# Patient Record
Sex: MALE | Race: WHITE | URBAN - METROPOLITAN AREA
[De-identification: names, ages, dates, MRNs, and addresses within clinical notes are randomized per-mention and may not be internally consistent; named-entity substitution may affect disease eponyms.]

---

## 2017-02-06 ENCOUNTER — OFFICE VISIT (OUTPATIENT)
Dept: NEUROSURGERY | Facility: CLINIC | Age: 62
End: 2017-02-06

## 2017-02-06 VITALS
BODY MASS INDEX: 30.08 KG/M2 | DIASTOLIC BLOOD PRESSURE: 66 MMHG | WEIGHT: 176.2 LBS | HEART RATE: 60 BPM | HEIGHT: 64 IN | SYSTOLIC BLOOD PRESSURE: 121 MMHG

## 2017-02-06 DIAGNOSIS — M48.061 SPINAL STENOSIS OF LUMBAR REGION WITH RADICULOPATHY: ICD-10-CM

## 2017-02-06 DIAGNOSIS — M54.16 SPINAL STENOSIS OF LUMBAR REGION WITH RADICULOPATHY: ICD-10-CM

## 2017-02-06 DIAGNOSIS — M43.16 SPONDYLOLISTHESIS OF LUMBAR REGION: Primary | ICD-10-CM

## 2017-02-06 RX ORDER — BUPRENORPHINE AND NALOXONE 4; 1 MG/1; MG/1
1 FILM, SOLUBLE BUCCAL; SUBLINGUAL 2 TIMES DAILY
Status: ON HOLD | COMMUNITY
Start: 2017-01-30 | End: 2017-03-24

## 2017-02-06 RX ORDER — PREGABALIN 100 MG/1
100 CAPSULE ORAL 3 TIMES DAILY
Status: ON HOLD | COMMUNITY
Start: 2017-01-13 | End: 2017-03-24

## 2017-02-06 ASSESSMENT — PAIN SCALES - GENERAL: PAINLEVEL: SEVERE PAIN (6)

## 2017-02-06 NOTE — MR AVS SNAPSHOT
After Visit Summary   2/6/2017    Jean-Claude Lowry    MRN: [id number]           Patient Information     Date Of Birth          1955        Visit Information        Provider Department      2/6/2017 8:30 AM Paco Salmeron MD [facility] Neurosurgery        Today's Diagnoses     Spondylolisthesis of lumbar region    -  1    Spinal stenosis of lumbar region with radiculopathy           Follow-ups after your visit        Additional Services     [facility] Referral       Your provider has referred you to the [facility].    Reason for Referral: Pre-Operative Pain Consult - the procedure will be performed at the following hospital: [facility]  3/22/2017    Please complete the following questions:    What is your diagnosis for the patient's pain? Lumbar stenosis    Do you have any specific questions for the pain specialist? No    Are there any red flags that may impact the assessment or management of the patient? Active or recent alcohol, drug or prescription medication misuse      **ANY DIAGNOSTIC TESTS THAT ARE NOT IN EPIC SHOULD BE SENT TO THE PAIN CENTER**    Please note the Pre-Op Pain Consult must be scheduled 2-3 weeks prior to the patient's surgery.  Patient's trying to schedule within 2 weeks of surgery may not be accommodated.     Pre-Op Pain Consults are only good for 30 days.    REGARDING OPIOID MEDICATIONS:  We will always address appropriateness of opioid pain medications, but we generally will not automatically take on a prescribing role. When we do take on prescribing of opioids for chronic pain, it is in collaboration with the referring physician for an intermediate period of time (months), with an expectation that the primary physician or provider will assume the prescribing role if medications are effective at stable doses with demonstrated compliance.  Therefore, please do not assume that your prescribing responsibilities end on the day of pain clinic  consultation.  Is this agreeable to you? NO: Not currently managing pain medications.  Will mangage post-op for period of 4-6 weeks as needed.     For any questions, contact the [facility] at [phone].    Please be aware that coverage of these services is subject to the terms and limitations of your health insurance plan.  Call member services at your health plan with any benefit or coverage questions.      Please bring the following with you to your appointment:    (1) Any X-Rays, CTs or MRIs which have been performed.  Contact the facility where they were done to arrange for  prior to your scheduled appointment.    (2) List of current medications   (3) This referral request   (4) Any documents/labs given to you for this referral            PAC Visit Referral (For [facility] Only)                 Your next 10 appointments already scheduled     Mar 15, 2017  9:30 AM CDT   (Arrive by 9:15 AM)   PAC RN ASSESSMENT with Uc Pac Rn   [facility] Preoperative Assessment Center ([facility])    [address]  [address] [facility] [id number] [phone]            Mar 15, 2017 10:00 AM CDT   (Arrive by 9:45 AM)   PAC EVALUATION with  Pac Iggy 5   [facility] Preoperative Assessment Center ([facility])    [address]  [address] [facility] [id number] [phone]            Mar 15, 2017 11:00 AM CDT   (Arrive by 10:45 AM)   PAC Anesthesia Consult with  Pac Anesthesiologist   [facility] Pre[facility] ([facility])    [address]  [address] [facility] [id number] [phone]            Mar 22, 2017   Procedure with Paco Salmeron MD   [facility], [place], Same Day Surgery (--)    [address] [id number] [phone]              Who to contact     Please call your clinic at [phone] to:    Ask questions about your health    Make or cancel  "appointments    Discuss your medicines    Learn about your test results    Speak to your doctor   If you have compliments or concerns about an experience at your clinic, or if you wish to file a complaint, please contact [facility] Patient Relations at [phone] or email us at [email or web address]         Additional Information About Your Visit        [employer] Information     [employer] is an electronic gateway that provides easy, online access to your medical records. With [employer], you can request a clinic appointment, read your test results, renew a prescription or communicate with your care team.     To sign up for [employer] visit the website at [email or web address]   You will be asked to enter the access code listed below, as well as some personal information. Please follow the directions to create your username and password.     Your access code is: [email or web address]  Expires: 2017  6:30 AM     Your access code will  in 90 days. If you need help or a new code, please contact your [facility] Clinic or call [phone] for assistance.        Care EveryWhere ID     This is your Care EveryWhere ID. This could be used by other organizations to access your [facility] medical records  [id number]        Your Vitals Were     Pulse Height BMI (Body Mass Index)             60 1.626 m (5' 4\") 30.24 kg/m2          Blood Pressure from Last 3 Encounters:   17 121/66   10/03/16 122/55   05/14/15 119/75    Weight from Last 3 Encounters:   17 79.9 kg (176 lb 3.2 oz)   10/03/16 82.6 kg (182 lb)   05/14/15 85.6 kg (188 lb 12.8 oz)              We Performed the Following     [facility] Referral     Abby-Operative Worksheet        Primary Care Provider Office Phone # Fax #    Quang Mejia [phone]       [facility] [address] [address]        Thank you!     Thank you for choosing [facility] " NEUROSURGERY  for your care. Our goal is always to provide you with excellent care. Hearing back from our patients is one way we can continue to improve our services. Please take a few minutes to complete the written survey that you may receive in the mail after your visit with us. Thank you!             Your Updated Medication List - Protect others around you: Learn how to safely use, store and throw away your medicines at www.disposemymeds.org.          This list is accurate as of: 2/6/17 11:59 PM.  Always use your most recent med list.                   Brand Name Dispense Instructions for use    ACIPHEX PO      Take 20 mg by mouth daily       CYMBALTA PO      Take 60 mg by mouth daily       GABAPENTIN PO      Take 600 mg by mouth daily       LISINOPRIL PO      Take 20 mg by mouth daily       LYRICA 100 MG capsule   Generic drug:  pregabalin          SUBOXONE 4-1 MG per film   Generic drug:  buprenorphine HCl-naloxone HCl      Place 1 each under the tongue       TRAZODONE HCL PO      Take 150-300 mg by mouth At Bedtime

## 2017-02-06 NOTE — PROGRESS NOTES
It was a pleasure to see Jean-Claude Lowry today in Neurosurgery Clinic. he is a 62 year old male who returns after a MRI for evaluation of his lumbar stenosis, spondylolisthesis and radiculopathy. He continues to have the same symptoms. He has seen GI at [facility] and there is a letter in our system clearing him for surgery from them. He works with Dr. Alcantar for addiction medicine.    Given his symptoms and imaging findings of lumbar spondylolisthesis and stenosis with radiculopathy, I believe a L4-5 fusion and decompression would be the best course of action. The risks benefits and alternatives to the procedure were discussed, questions solicited and answered, and the patient wishes to proceed with surgery. Risks including but not limited to: bleeding, infection, CSF leak, neurologic injury, failure to improve, need for future surgery.    We will work on scheduling surgery. We will obtain preop pain management consult and discuss with Dr. Alcantar.

## 2017-02-06 NOTE — NURSING NOTE
Chief Complaint   Patient presents with     RECHECK     [facility] RETURN - Dx: L4-L5 spinal canal stenosis with accociated radiculopathy - has had MRI     Evelia Campa MA

## 2017-02-06 NOTE — LETTER
2/6/2017       RE: Jean-Claude Lowry  [address] [facility]  [address]  [place] [address]     Dear Colleague,    Thank you for referring your patient, Jean-Claude Lowry, to the [facility] NEUROSURGERY at [facility]. Please see a copy of my visit note below.    It was a pleasure to see Jean-Claude Lowry today in Neurosurgery Clinic. he is a 62 year old male who returns after a MRI for evaluation of his lumbar stenosis, spondylolisthesis and radiculopathy. He continues to have the same symptoms. He has seen GI at [facility] and there is a letter in our system clearing him for surgery from them. He works with Dr. Alcatnar for addiction medicine.    Given his symptoms and imaging findings of lumbar spondylolisthesis and stenosis with radiculopathy, I believe a L4-5 fusion and decompression would be the best course of action. The risks benefits and alternatives to the procedure were discussed, questions solicited and answered, and the patient wishes to proceed with surgery. Risks including but not limited to: bleeding, infection, CSF leak, neurologic injury, failure to improve, need for future surgery.    We will work on scheduling surgery. We will obtain preop pain management consult and discuss with Dr. Alcantar.    Again, thank you for allowing me to participate in the care of your patient.      Sincerely,    Paco Salmeron MD

## 2017-02-27 ENCOUNTER — TELEPHONE (OUTPATIENT)
Dept: NEUROSURGERY | Facility: CLINIC | Age: 62
End: 2017-02-27

## 2017-02-27 NOTE — TELEPHONE ENCOUNTER
PC to pt in response to in-basket message requesting call back in regards to pre-op questions.   No answer at given number.  Left VM with contact info and instructions to return call at patient's convenience.

## 2017-03-15 ENCOUNTER — OFFICE VISIT (OUTPATIENT)
Dept: SURGERY | Facility: CLINIC | Age: 62
End: 2017-03-15

## 2017-03-15 ENCOUNTER — ANESTHESIA EVENT (OUTPATIENT)
Dept: SURGERY | Facility: CLINIC | Age: 62
DRG: 460 | End: 2017-03-15
Payer: MEDICARE

## 2017-03-15 ENCOUNTER — ALLIED HEALTH/NURSE VISIT (OUTPATIENT)
Dept: SURGERY | Facility: CLINIC | Age: 62
End: 2017-03-15

## 2017-03-15 VITALS
WEIGHT: 185.7 LBS | HEIGHT: 64 IN | BODY MASS INDEX: 31.7 KG/M2 | SYSTOLIC BLOOD PRESSURE: 119 MMHG | DIASTOLIC BLOOD PRESSURE: 72 MMHG | RESPIRATION RATE: 16 BRPM | TEMPERATURE: 98.6 F | HEART RATE: 82 BPM | OXYGEN SATURATION: 93 %

## 2017-03-15 DIAGNOSIS — Z01.818 PREOP EXAMINATION: Primary | ICD-10-CM

## 2017-03-15 DIAGNOSIS — M48.061 LUMBAR STENOSIS: ICD-10-CM

## 2017-03-15 DIAGNOSIS — Z01.818 PRE-OP EXAMINATION: Primary | ICD-10-CM

## 2017-03-15 LAB
ALBUMIN SERPL-MCNC: 3.2 G/DL (ref 3.4–5)
ALBUMIN UR-MCNC: NEGATIVE MG/DL
ALP SERPL-CCNC: 82 U/L (ref 40–150)
ALT SERPL W P-5'-P-CCNC: 14 U/L (ref 0–70)
ANION GAP SERPL CALCULATED.3IONS-SCNC: 6 MMOL/L (ref 3–14)
APPEARANCE UR: CLEAR
APTT PPP: 29 SEC (ref 22–37)
AST SERPL W P-5'-P-CCNC: 12 U/L (ref 0–45)
BILIRUB SERPL-MCNC: 0.8 MG/DL (ref 0.2–1.3)
BILIRUB UR QL STRIP: NEGATIVE
BUN SERPL-MCNC: 17 MG/DL (ref 7–30)
CALCIUM SERPL-MCNC: 8.6 MG/DL (ref 8.5–10.1)
CHLORIDE SERPL-SCNC: 102 MMOL/L (ref 94–109)
CO2 SERPL-SCNC: 31 MMOL/L (ref 20–32)
COLOR UR AUTO: YELLOW
CREAT SERPL-MCNC: 0.86 MG/DL (ref 0.66–1.25)
ERYTHROCYTE [DISTWIDTH] IN BLOOD BY AUTOMATED COUNT: 13 % (ref 10–15)
GFR SERPL CREATININE-BSD FRML MDRD: ABNORMAL ML/MIN/1.7M2
GLUCOSE SERPL-MCNC: 212 MG/DL (ref 70–99)
GLUCOSE UR STRIP-MCNC: NEGATIVE MG/DL
HBA1C MFR BLD: 6.2 % (ref 4.3–6)
HCT VFR BLD AUTO: 41.7 % (ref 40–53)
HGB BLD-MCNC: 13.4 G/DL (ref 13.3–17.7)
HGB UR QL STRIP: NEGATIVE
INR PPP: 1.06 (ref 0.86–1.14)
KETONES UR STRIP-MCNC: NEGATIVE MG/DL
LEUKOCYTE ESTERASE UR QL STRIP: NEGATIVE
MCH RBC QN AUTO: 29.5 PG (ref 26.5–33)
MCHC RBC AUTO-ENTMCNC: 32.1 G/DL (ref 31.5–36.5)
MCV RBC AUTO: 92 FL (ref 78–100)
NITRATE UR QL: NEGATIVE
PH UR STRIP: 8 PH (ref 5–7)
PLATELET # BLD AUTO: 136 10E9/L (ref 150–450)
POTASSIUM SERPL-SCNC: 4.3 MMOL/L (ref 3.4–5.3)
PROT SERPL-MCNC: 7.5 G/DL (ref 6.8–8.8)
RBC # BLD AUTO: 4.54 10E12/L (ref 4.4–5.9)
SODIUM SERPL-SCNC: 138 MMOL/L (ref 133–144)
SP GR UR STRIP: 1.01 (ref 1–1.03)
URN SPEC COLLECT METH UR: ABNORMAL
UROBILINOGEN UR STRIP-MCNC: 2 MG/DL (ref 0–2)
WBC # BLD AUTO: 11.7 10E9/L (ref 4–11)

## 2017-03-15 RX ORDER — LEDIPASVIR AND SOFOSBUVIR 90; 400 MG/1; MG/1
1 TABLET, FILM COATED ORAL EVERY MORNING
COMMUNITY
Start: 2017-02-03

## 2017-03-15 NOTE — PATIENT INSTRUCTIONS
Preparing for Your Surgery  Name:  Jean-Claude Lowry   MRN:  [id number]   :  1955   Today's Date:  3/15/2017     Arriving for surgery:  Surgery date:  3/22/17  Surgery time:  9:15 AM  Arrival time:  7:15 AM    Please come to:     [facility]  [address] [place]  [address]    -   parking is available in front of the hospital from 5:15 am to 8:00 pm    -  Stop at the Information Desk in the lobby    -   Inform the information person that you are here for surgery. An escort to 3c will be provided. If you would not like an escort, please proceed to 3C on the 3rd floor. [phone]     What can I eat or drink?  -  You may have solid food or milk products until 8 hours prior to your surgery.  -  You may have clear liquids such as: water, gatorade, tea, black coffee, broth, jello, apple juice or 7up/Sprite until 2 hours prior to your surgery.    Which medicines can I take?        -  Please follow your doctor's recommedations regarding Suboxone.    -  Please take these medications the day of surgery: all regularly scheduled morning medications.    How do I prepare myself?  -  Take two showers: one the night before surgery; and one the morning of surgery.         Use Scrubcare or Hibiclens to wash from neck down.  You may use your own shampoo and conditioner. No other hair products.   -  Do NOT use lotion, powder, deodorant, or antiperspirant the day of your surgery.  -  Do NOT wear any makeup, fingernail polish or jewelry.  -  Do not bring your own medications to the hospital, except for inhalers and eye drops.  -  Bring your ID and insurance card.    Questions or Concerns:  If you have questions or concerns, please call the  Preoperative Assessment Center, Monday-Friday 7AM-7PM:  [phone]    AFTER YOUR SURGERY  Breathing exercises   Breathing exercises help you recover faster. Take deep breaths and let the air out slowly. This will:     Help you wake  up after surgery.    Help prevent complications like pneumonia.  Preventing complications will help you go home sooner.   We may give you a breathing device (incentive spirometer) to encourage you to breathe deeply.   Nausea and vomiting   You may feel sick to your stomach after surgery; if so, let your nurse know.    Pain control:  After surgery, you may have pain. Our goal is to help you manage your pain. Pain medicine will help you feel comfortable enough to do activities that will help you heal.  These activities may include breathing exercises, walking and physical therapy.   To help your health care team treat your pain we will ask: 1) If you have pain  2) where it is located 3) describe your pain in your words  Methods of pain control include medications given by mouth, vein or by nerve block for some surgeries.  We may give you a pain control pump that will:  1) Deliver the medicine through a tube placed in your vein  2) Control the amount of medicine you receive  3) Allow you to push a button to deliver a dose of pain medicine  Sequential Compression Device (SCD) or Pneumo Boots:  You may need to wear SCD S on your legs or feet. These are wraps connected to a machine that pumps in air and releases it. The repeated pumping helps prevent blood clots from forming.

## 2017-03-15 NOTE — PROGRESS NOTES
Preoperative Assessment Center medication history for March 15, 2017 is complete.  See Epic admission navigator for allergy information, pharmacy, prior to admission medications and immunization status.    Operating room staff will still need to confirm medications and last dose information on day of surgery.     Medication history interview sources:  Patient Interview and [facility]     Changes made to PTA medication list (reason)  Added:   - -Harvoni per patient and confirmation with pharmacy     Deleted:   -- lisinopril - patient reports being taken off last month because his BP was low     Changed:   -- added directions for gabapentin   -- added directions for pregabalin   -- added directions for suboxone     Additional medication history information (including reliability of information, actions taken by pharmacist):  -- patient is taking both gabapentin and pregabalin       Prior to Admission medications    Medication Sig Last Dose Taking? Auth Provider   ledipasvir-sofosbuvir (HARVONI)  MG per tablet Take 1 tablet by mouth every morning Taking Yes Reported, Patient   buprenorphine HCl-naloxone HCl (SUBOXONE) 4-1 MG per film Place 1 each under the tongue 2 times daily  Taking Yes Reported, Patient   pregabalin (LYRICA) 100 MG capsule Take 100 mg by mouth 3 times daily  Taking Yes Reported, Patient   RABEprazole Sodium (ACIPHEX PO) Take 20 mg by mouth every morning  Taking Yes Reported, Patient   DULoxetine HCl (CYMBALTA PO) Take 60 mg by mouth At Bedtime  Taking Yes Reported, Patient   TRAZODONE HCL PO Take 150-300 mg by mouth At Bedtime Taking Yes Reported, Patient   GABAPENTIN PO Take by mouth 3 times daily 600 mg AM, 300 mg afternoon, 600 mg HS Taking Yes Reported, Patient         Medication history completed by:   Henrik Javier, Pharm.D, BCPS

## 2017-03-15 NOTE — PHARMACY - PREOPERATIVE ASSESSMENT CENTER
PREOPERATIVE PAIN CONSULT FOR POSTOPERATIVE PAIN MANAGEMENT  Jean-Claude Lowry was seen and interviewed during time of PAC Clinic appointment March 15, 2017. The following recommendations ONLY apply for the planned surgical procedure with Dr Salmeron on 3/22/17 at the [facility] for 1 level spinal fusion.  These postoperative recommendations are intended for patients admitted to the hospital after surgery. These recommendations are only valid for 30 days from the date of service. If there are significant changes in opioid dosing between today and day of surgery the below recommendations may have to be adjusted.   Verbal consent was given by patient to access pharmacy records and [place] Prescription Monitoring Profile: Yes    Based on [place] Prescription Monitoring Profile and patient interview:     - OUTPATIENT MEDICATIONS (related to pain management):  -- Long-acting opioid: None  -- Short-acting opioid: None  -- Intrathecal pump: None  -- Oral adjuvant(s): gabapentin [id number] by mouth TID, pregabalin 100mg by mouth TID, Duloxetine 60mg by mouth at bedtime   -- Topicals: None  -- Bowel Regimen: None     ASSESSMENT:   Jean-Claude Lowry has a history of back pain that starts in his low back, radiates to his left buttock, to this lateran and anterior thigh and down to his big toe. The pain is constant and he describes it as dull and achy. Movement aggravates the pain and he rates it as 7-8/10 on a 0-10 VAS. He is currently on suboxone that was started back in 8/2016 and managed by Dr. Koki Alcantar. The patient's addiction was to oxyCONTIN is what he reports to me. Looking at his chart, he had an intentional opiate overdose in 04/2016 too. I have reached out to Dr. Salmeron who would prefer to have suboxone stopped prior to this procedure. I have also reached out to Dr. Alcantar to relay Dr. Salmeron's wishes and to see how she would like to proceed. Dr. Gabrielson called me back  and we discussed the case. The final plan according to Dr. Alcantar is to stop the suboxone 3 days prior to the procedure. She does not intend to prescribe any opioids during the 3 days that the patient is off the suboxone as she believes he isn't taking a high enough dose of suboxone to require supplemental opioids to prevent withdrawal. She will give him her number to the clinic if the patient has any problems. She will contact the patient to relay this information. She did request that after the procedure, Dr. Salmeron to touch base with her to determine what the typical post operative period is for pain so that she can set up an appointment to resume the patient's suboxone.       Outpatient opioid oral morphine equivalent (OME): 0mg/day    Pain medications tried in the past:   -- patient reports trying all opioids in the past but no SE with any of these  -- because of patient's addiction history with oxyCONTIN, I will avoid using this.   -- patient is 62 years old, and so his renal function may not be the best for morphine so I will recommend utilizing HYDROmorphone.   -- ANNE MARIE history - probable from the previous sleep studies but has another sleep study Friday.     RECOMMENDATIONS:   The following pain management recommendations are made based on information from today's visit and should not replace medical decision-making based on patient condition at the time of surgery or postoperatively.      - PREOPERATIVE:  -- Continue suboxone 4-1mg, 1 film SL BID through 3/18/17. Starting 3/19 and up to the day of surgery, do not take.   -- Continue adjuvants/nonopioid analgesics - gabapentin [id number] by mouth TID, pregabalin 100mg by mouth TID     --  Before surgery recommend gabapentin 300 mg PO x 1 dose in pre-op area (will need to be written on day of surgery).   -- Before surgery consider acetaminophen 1000 mg PO in pre-op   --Consider urine drug screen on day of surgery given patient's history of substance  use.     - INTRAOPERATIVE (Anesthesiologist/CRNA to consider):   -- Regional anesthesia: Defer to anesthesiologist   -- Ketamine IV intraoperatively  -- Dexmedetomidine IV intraoperatively  -- Avoid remifentanil as able to reduce risk of developing hyperalgesia    - POSTOPERATIVE MANAGEMENT:  Place pain management consult to assist with acute postoperative pain management.    Opioid analgesic:  + If able to take oral medications (preferred):   -- Begin with HYDROmorphone 2-4mg by mouth every 3 hour as needed with low tolerance to increase overnight to 4-6mg by mouth every 3 hour as needed if pain not controlled on 4mg. (hold or reduce dose as needed if patient has s/sx sedation or respiratory depression)  -- Initiate HYDROmorphone 0.2-0.4mg IV every 2 hour as needed breakthrough pain      + If unable to take oral medications:   -- HYDROmorphone (Dilaudid) PCA doses 0.2-0.4 mg Q 10 min lockout interval with NO continuous rate. The PCA doses are for acute postoperative pain. If patient is showing s/sx sedation or respiratory depression, decrease PCA doses as necessary.      Nonopioid analgesics:   -- acetaminophen 1,000 mg PO every 8 hr scheduled   -- gabapentin 600 mg PO every 8 hours scheduled (intentional higher than home dose to control pain)  -- pregabalin 100mg by mouth TID     Muscle Relaxant:   --methocarbamol 500 mg PO Q6 hr PRN muscle spasm OR  --If unable to tolerate PO meds, use Diazepam (Valium) 2.5-5 mg IV Q 6 hr PRN muscle spasm    Stool softeners/Laxatives:   -- When appropriate start senna-docusate 1-2 tabs PO BID and Miralax 17 g daily to prevent opioid induced constipation.     Other:  -- Recommend close monitoring of respiratory status postoperatively with capnography and/or SpO2 monitoring.     -- Ketamine IV infusion.  If needed for acute postop uncontrolled pain, the primary service may decide to start ketamine infusion at 5mg/hr (0.6mg/kg/hr based on dry weight of 84 kg). Ketamine should only  be started if the patient is cardiovascularly stable.     Low dose ketamine may be administered on a regular nursing unit according to [facility] ketamine-low dose policy.  Please see: [email or web address]   Obtain acrylic lockbox from central pharmacy  Obtain portless tubing from [employer] .  Order #[id number]  Monitoring:    Vital signs every 4 hours if patient is stable.   More frequent assessments may be indicated if patient is not stable.  Adverse effects:    Psychotomimetic effects (hallucinations or dreamlike feelings) are the most common adverse effect at these low doses, but this adverse event doesn t happen all the time.  These effects may be reduced or prevented by co-administration of a benzodiazepine:  e.g. lorazepam or diazepam    Dose related and therefore less common at  low doses:   hypotension or hypertension, excessive sedation    At low doses, ketamine does NOT affect respiratory function.  Discontinuation of Therapy:No formal weaning process for ketamine is needed    The inpatient pain service will follow up on patient after consult is placed and offer further recommendations for pain management.  If immediate assistance is needed please contact the pain service at the number below.     Henrik Javier RPH  March 15, 2017  12:07 PM    If questions or concerns, please contact the Inpatient Pain Management Service:  Call [phone] after hours, weekends and holidays.   Page [phone] from 8 AM - 3 PM Mon - Fri.

## 2017-03-15 NOTE — MR AVS SNAPSHOT
After Visit Summary   3/15/2017    Jean-Claude Lowry    MRN: [id number]           Patient Information     Date Of Birth          1955        Visit Information        Provider Department      3/15/2017 11:00 AM Rn, [facility]        Care Instructions    Preparing for Your Surgery  Name:  Jean-Claude Lowry   MRN:  [id number]   :  1955   Today's Date:  3/15/2017     Arriving for surgery:  Surgery date:  3/22/17  Surgery time:  9:15 AM  Arrival time:  7:15 AM    Please come to:     [facility], [address]  [address] [place] [address]    -   parking is available in front of the hospital from 5:15 am to 8:00 pm    -  Stop at the Information Desk in the lobby    -   Inform the information person that you are here for surgery. An escort to 3c will be provided. If you would not like an escort, please proceed to 3C on the 3rd floor. [phone]     What can I eat or drink?  -  You may have solid food or milk products until 8 hours prior to your surgery.  -  You may have clear liquids such as: water, gatorade, tea, black coffee, broth, jello, apple juice or 7up/Sprite until 2 hours prior to your surgery.    Which medicines can I take?        -  Please follow your doctor's recommedations regarding Suboxone.    -  Please take these medications the day of surgery: all regularly scheduled morning medications.    How do I prepare myself?  -  Take two showers: one the night before surgery; and one the morning of surgery.         Use Scrubcare or Hibiclens to wash from neck down.  You may use your own shampoo and conditioner. No other hair products.   -  Do NOT use lotion, powder, deodorant, or antiperspirant the day of your surgery.  -  Do NOT wear any makeup, fingernail polish or jewelry.  -  Do not bring your own medications to the hospital, except for inhalers and eye drops.  -  Bring your ID and insurance  card.    Questions or Concerns:  If you have questions or concerns, please call the  [facility] Center, Monday-Friday 7AM-7PM:  [phone]    AFTER YOUR SURGERY  Breathing exercises   Breathing exercises help you recover faster. Take deep breaths and let the air out slowly. This will:     Help you wake up after surgery.    Help prevent complications like pneumonia.  Preventing complications will help you go home sooner.   We may give you a breathing device (incentive spirometer) to encourage you to breathe deeply.   Nausea and vomiting   You may feel sick to your stomach after surgery; if so, let your nurse know.    Pain control:  After surgery, you may have pain. Our goal is to help you manage your pain. Pain medicine will help you feel comfortable enough to do activities that will help you heal.  These activities may include breathing exercises, walking and physical therapy.   To help your health care team treat your pain we will ask: 1) If you have pain  2) where it is located 3) describe your pain in your words  Methods of pain control include medications given by mouth, vein or by nerve block for some surgeries.  We may give you a pain control pump that will:  1) Deliver the medicine through a tube placed in your vein  2) Control the amount of medicine you receive  3) Allow you to push a button to deliver a dose of pain medicine  Sequential Compression Device (SCD) or Pneumo Boots:  You may need to wear SCD S on your legs or feet. These are wraps connected to a machine that pumps in air and releases it. The repeated pumping helps prevent blood clots from forming.         Follow-ups after your visit        Your next 10 appointments already scheduled     Mar 15, 2017 11:30 AM CDT   LAB with  LAB   [facility] ([facility])    [address] [place] [id number] [phone]           Patient must bring picture ID.  Patient should be prepared to  give a urine specimen  Please do not eat 10-12 hours before your appointment if you are coming in fasting for labs on lipids, cholesterol, or glucose (sugar).  Pregnant women should follow their Care Team instructions. Water with medications is okay. Do not drink coffee or other fluids.   If you have concerns about taking  your medications, please ask at office or if scheduling via [employer], send a message by clicking on Secure Messaging, Message Your Care Team.            Mar 15, 2017 11:50 AM CDT   (Arrive by 11:35 AM)   PAC Anesthesia Consult with  Pac Anesthesiologist   [facility] ([facility])    [address]  4th Floor  [facility] [id number] [phone]            Mar 22, 2017   Procedure with Paco Salmeron MD   [facility] [place], Same Day Surgery (--)    [address] [facility] [id number] [phone]              Future tests that were ordered for you today     Open Future Orders        Priority Expected Expires Ordered    ABO/Rh type and screen Routine 3/15/2017 4/14/2017 3/15/2017    CBC with platelets Routine 3/15/2017 4/14/2017 3/15/2017    Comprehensive metabolic panel Routine 3/15/2017 4/14/2017 3/15/2017    Hemoglobin A1c Routine 3/15/2017 4/14/2017 3/15/2017    INR Routine 3/15/2017 4/14/2017 3/15/2017    Partial thromboplastin time Routine 3/15/2017 4/14/2017 3/15/2017    UA reflex to Microscopic and Culture Routine 3/15/2017 4/14/2017 3/15/2017            Who to contact     Please call your clinic at [phone] to:    Ask questions about your health    Make or cancel appointments    Discuss your medicines    Learn about your test results    Speak to your doctor   If you have compliments or concerns about an experience at your clinic, or if you wish to file a complaint, please contact [facility] Patient Relations at [phone] or email us at [email or web address]         Additional  Information About Your Visit        2[employer] Online Information     [employer] Online is an electronic gateway that provides easy, online access to your medical records. With [employer] Online, you can request a clinic appointment, read your test results, renew a prescription or communicate with your care team.     To sign up for [employer] Online visit the website at [email or web address]   You will be asked to enter the access code listed below, as well as some personal information. Please follow the directions to create your username and password.     Your access code is: [email or web address]  Expires: 2017  7:30 AM     Your access code will  in 90 days. If you need help or a new code, please contact your [facility] or call [phone] for assistance.        Care EveryWhere ID     This is your Care EveryWhere ID. This could be used by other organizations to access your [facility] medical records  [id number]         Blood Pressure from Last 3 Encounters:   03/15/17 119/72   17 121/66   10/03/16 122/55    Weight from Last 3 Encounters:   03/15/17 84.2 kg (185 lb 11.2 oz)   17 79.9 kg (176 lb 3.2 oz)   10/03/16 82.6 kg (182 lb)              Today, you had the following     No orders found for display       Primary Care Provider Office Phone # Fax #    Quang Mejia [phone]       [facility] [address] [place] [id number]        Thank you!     Thank you for choosing [facility] [place]  for your care. Our goal is always to provide you with excellent care. Hearing back from our patients is one way we can continue to improve our services. Please take a few minutes to complete the written survey that you may receive in the mail after your visit with us. Thank you!             Your Updated Medication List - Protect others around you: Learn how to safely use, store and throw away your medicines at www.disposemymeds.org.          This list is accurate  as of: 3/15/17 11:04 AM.  Always use your most recent med list.                   Brand Name Dispense Instructions for use    ACIPHEX PO      Take 20 mg by mouth every morning       CYMBALTA PO      Take 60 mg by mouth At Bedtime       GABAPENTIN PO      Take by mouth 3 times daily 600 mg AM, 300 mg afternoon, 600 mg HS       ledipasvir-sofosbuvir  MG per tablet    HARVONI     Take 1 tablet by mouth every morning       LYRICA 100 MG capsule   Generic drug:  pregabalin      Take 100 mg by mouth 3 times daily       SUBOXONE 4-1 MG per film   Generic drug:  buprenorphine HCl-naloxone HCl      Place 1 each under the tongue 2 times daily       TRAZODONE HCL PO      Take 150-300 mg by mouth At Bedtime

## 2017-03-15 NOTE — ANESTHESIA PREPROCEDURE EVALUATION
"  Anesthesia Evaluation     . Pt has had prior anesthetic. Type: General    History of anesthetic complications  - PONV    ROS/MED HX    ENT/Pulmonary:     (+)ANNE MARIE risk factors (He is in the process of having sleep study.) hypertension, , . .    Neurologic:     (+)other neuro Four back surgeries in the past.  Second surgery patient had Dural tear >>> left foot drop that has since resolved with PT.    Cardiovascular:     (+) hypertension-range: 120's/60's, ---. : . . . :. . No previous cardiac testing      (-) taking anticoagulants/antiplatelets   METS/Exercise Tolerance: Comment: Lives on second floor and walks up 17 steps a few times a day.  Walks his dogs. >4 METS   Hematologic:     (+) History of Transfusion no previous transfusion reaction -     (-) history of blood clots and anemia   Musculoskeletal: Comment: H/O right ankle surgery with instrumentation.  (+) , , other musculoskeletal (Right knee pain.)-       GI/Hepatic:     (+) GERD Asymptomatic on medication, hepatitis type C, liver disease (\"scaring of my liver\".  History of ETOH abuse.  Last drink 2 years.),       Renal/Genitourinary:      (-) renal disease   Endo:     (+) type II DM (Manages it through diet.) Not using insulin - not using insulin pump Diabetic complications: neuropathy, .      Psychiatric:     (+) psychiatric history depression ([place])      Infectious Disease:  - neg infectious disease ROS       Malignancy:         Other:    (+) No chance of pregnancy C-spine cleared: N/A, H/O Chronic Pain,H/O chronic opiod use , other significant disability Other (comment)             Physical Exam  Normal systems: cardiovascular and pulmonary    Airway   Mallampati: III  TM distance: >3 FB  Neck ROM: full    Dental   (+) missing  Comment: Patient does not have any teeth.  He has dentures, but does not wear as they do not fit.     Cardiovascular   Rhythm and rate: regular and normal      Pulmonary    breath sounds clear to auscultation    Other " findings: MR LUMBAR SPINE W/O CONTRAST 10/14/2016 11:43 AM   Impression:   1. Right subarticular superiorly migrating extrusion superimposed on  posterior disc bulge at L4-5 with compression of the traversing right  L5 nerve root and moderate spinal canal stenosis. This has not  significantly changed from 4/30/2015 dated CT, given the differences  in technique.  2. Unchanged severe left neural foraminal stenosis at L5-S1.    For further details of assessment, testing, and physical exam please see H and P completed on same date.    Lab Results      Component                Value               Date                      WBC                      11.7                03/15/2017            Lab Results      Component                Value               Date                      RBC                      4.54                03/15/2017            Lab Results      Component                Value               Date                      HGB                      13.4                03/15/2017            Lab Results      Component                Value               Date                      HCT                      41.7                03/15/2017            Lab Results      Component                Value               Date                      MCV                      92                  03/15/2017            Lab Results      Component                Value               Date                      MCH                      29.5                03/15/2017            Lab Results      Component                Value               Date                      MCHC                     32.1                03/15/2017            Lab Results      Component                Value               Date                      RDW                      13.0                03/15/2017            Lab Results      Component                Value               Date                      PLT                      136                 03/15/2017              Last Basic  Metabolic Panel:  Lab Results      Component                Value               Date                      NA                       138                 03/15/2017             Lab Results      Component                Value               Date                      POTASSIUM                4.3                 03/15/2017            Lab Results      Component                Value               Date                      CHLORIDE                 102                 03/15/2017            Lab Results      Component                Value               Date                      ZAIRE                      8.6                 03/15/2017            Lab Results      Component                Value               Date                      CO2                      31                  03/15/2017            Lab Results      Component                Value               Date                      BUN                      17                  03/15/2017            Lab Results      Component                Value               Date                      CR                       0.86                03/15/2017            Lab Results      Component                Value               Date                      GLC                      212                 03/15/2017                         PAC Discussion and Assessment    ASA Classification: 3  Case is suitable for: [place]  Anesthetic techniques and relevant risks discussed: GA  Invasive monitoring and risk discussed: Yes  Types:   Possibility and Risk of blood transfusion discussed: Yes  NPO instructions given:   Additional anesthetic preparation and risks discussed:   Needs early admission to pre-op area:   Other:     PAC Resident/NP Anesthesia Assessment:  Jean-Claude Lowry is a 62 year old male scheduled for O-Arm/Stealth Assisted Lumbar 4-5 Decompression, Bilateral Transforaminal Lumbar Interbody Fusion, Pedicle Screw Instrumentation with Dr. Salmeron on 3/22/2017 at [facility]  "[facility] under general anesthesia.  Mr. Lowry has a long history of back issues dating back to the 1980's.  He underwent laminectomies at the L4 and L5 levels in the 1990's.  His second laminectomy was complicated by a dural tear.  Dural tear repair occurred later and resulted in a left foot drop.  Subsequent L5-S1 fusion, with date unknown.  Right S1 joint fusion in 2006.  Records indicate he wore a TLSO from 0635-3308.  His back pain increased about 1.5 years ago and despite medical management, physical therapy and injections, pain continues.  He is followed by Dr Alcantar for addiction medicine and takes suboxone.    He has Hepatitis C and was started on Harvoni \"about a week ago\" and is followed by Dr. Adolfo Ventura , at [facility].  He reports he was recently diagnosed with sleep apnea and is scheduled to be fitted for CPAP mask on Friday, 3/17/2017.  About a month ago, he was seen in an OSH ED for hypotension and ACE I was discontinued.    PAC referral for risk assessment and optimization of anesthesia with comorbid conditions of:  H/O HTN; ANNE MARIE; GERD; Hepatitis C; DM II; chronic pain and depression.    Cardiology - RCRI : No serious cardiac risks.  0.4% risk of major adverse cardiac event. METS >4.  No cardiac history. Denies any chest pain, dyspnea, or lightheadedness.  Pulmonary -  No cigarette smoking.  Smokes marijuana daily. Recently diagnosed with ANNE MARIE>>>will be fitted with CPAP mask 3/17/2017>>>instructed to bring CPAP with DOS.  GI - GERD, take PPI DOD.  Hepatitis C>>>started Harvoni on 3/4/2017 with end date of 5/27/17 followed by Dr. Adolfo Ventura at [facility]  Endocrine - DM II, diet managed.  Hgb 6.2 today.  Musculoskeletal - Recommend careful positioning given generalized chronic pain, right knee pain, h/o of right ankle surgery with instrumentation and h/o foot drop in LLE      He has the following specific operative considerations:   - Anesthesia " considerations:  Refer to PAC assessment in anesthesia records  - VTE risk:  1.8%  - Known PONV, anti-emetic intervention recommended.  - Chronic pain followed by Dr. Alcantar at Addiction Medicine.  Patient is on Suboxone.  Please see Henrik Myers's note (PAC pharmacist) regarding pain management..      Arrival time, NPO, shower and medication instructions provided by nursing staff today.  Preparing For Your Surgery handout given.  Patient was discussed with Dr Reyna. I spent 20 minutes face to face with patient, assessing, examining, and educating.        Reviewed and Signed by PAC Mid-Level Provider/Resident  Mid-Level Provider/Resident: Denise TOMLIN CNP  Date: 3/15/2017  Time: 10:37 am    Attending Anesthesiologist Anesthesia Assessment:  62 year old for stealth assisted lumbar decompression and fusion L4/5. Chart reviewed, patient seen and evaluated; agree with above assessment. Patient has had multiple back surgeries and now is on suboxone - plan to be set by Dr. Alcantar. No known cardiac or pulmonary disease; stable HTN, DM II, hep c.     Patient is appropriate for the planned procedure without further workup or medical management change. The final anesthesia plan will be determined by the physician anesthesiologist caring for the patient on the day of surgery.      Reviewed and Signed by PAC Anesthesiologist  Anesthesiologist: GINO  Date: 3/15/2017  Time:   Pass/Fail: Pass  Disposition:     PAC Pharmacist Assessment:  PREOPERATIVE PAIN CONSULT FOR POSTOPERATIVE PAIN MANAGEMENT  Jean-Claude Lowry was seen and interviewed during time of PAC Clinic appointment March 15, 2017. The following recommendations ONLY apply for the planned surgical procedure with Dr Salmeron on 3/22/17 at the [facility] for 1 level spinal fusion. These postoperative recommendations are intended for patients admitted to the hospital after surgery. These recommendations are only valid for 30 days from the  date of service. If there are significant changes in opioid dosing between today and day of surgery the below recommendations may have to be adjusted.   Verbal consent was given by patient to access pharmacy records and [place] Prescription Monitoring Profile: Yes     Based on [place] Prescription Monitoring Profile and patient interview:      - OUTPATIENT MEDICATIONS (related to pain management):  -- Long-acting opioid: None  -- Short-acting opioid: None  -- Intrathecal pump: None  -- Oral adjuvant(s): gabapentin [id number] by mouth TID, pregabalin 100mg by mouth TID, Duloxetine 60mg by mouth at bedtime   -- Topicals: None  -- Bowel Regimen: None      ASSESSMENT:   Jean-Claude Lowry has a history of back pain that starts in his low back, radiates to his left buttock, to this lateran and anterior thigh and down to his big toe. The pain is constant and he describes it as dull and achy. Movement aggravates the pain and he rates it as 7-8/10 on a 0-10 VAS. He is currently on suboxone that was started back in 8/2016 and managed by Dr. Koki Alcantar. The patient's addiction was to oxyCONTIN is what he reports to me. Looking at his chart, he had an intentional opiate overdose in 04/2016 too. I have reached out to Dr. Salmeron who would prefer to have suboxone stopped prior to this procedure. I have also reached out to Dr. Alcantar to relay Dr. Salmeron's wishes and to see how she would like to proceed. Dr. Gabrielson called me back and we discussed the case. The final plan according to Dr. Alcantar is to stop the suboxone 3 days prior to the procedure. She does not intend to prescribe any opioids during the 3 days that the patient is off the suboxone as she believes he isn't taking a high enough dose of suboxone to require supplemental opioids to prevent withdrawal. She will give him her number to the clinic if the patient has any problems. She will contact the patient to relay this information. She did request  that after the procedure, Dr. Salmeron to touch base with her to determine what the typical post operative period is for pain so that she can set up an appointment to resume the patient's suboxone.   Outpatient opioid oral morphine equivalent (OME): 0mg/day     Pain medications tried in the past:   -- patient reports trying all opioids in the past but no SE with any of these  -- because of patient's addiction history with oxyCONTIN, I will avoid using this.   -- patient is 62 years old, and so his renal function may not be the best for morphine so I will recommend utilizing HYDROmorphone.   -- ANNE MARIE history - probable from the previous sleep studies but has another sleep study Friday.      RECOMMENDATIONS:   The following pain management recommendations are made based on information from today's visit and should not replace medical decision-making based on patient condition at the time of surgery or postoperatively.      - PREOPERATIVE:  -- Continue suboxone 4-1mg, 1 film SL BID through 3/18/17. Starting 3/19 and up to the day of surgery, do not take.   -- Continue adjuvants/nonopioid analgesics - gabapentin [id number] by mouth TID, pregabalin 100mg by mouth TID      -- Before surgery recommend gabapentin 300 mg PO x 1 dose in pre-op area (will need to be written on day of surgery).   -- Before surgery consider acetaminophen 1000 mg PO in pre-op   --Consider urine drug screen on day of surgery given patient's history of substance use.      - INTRAOPERATIVE (Anesthesiologist/CRNA to consider):   -- Regional anesthesia: Defer to anesthesiologist   -- Ketamine IV intraoperatively  -- Dexmedetomidine IV intraoperatively  -- Avoid remifentanil as able to reduce risk of developing hyperalgesia     - POSTOPERATIVE MANAGEMENT:  Place pain management consult to assist with acute postoperative pain management.   Opioid analgesic:  + If able to take oral medications (preferred):   -- Begin with HYDROmorphone 2-4mg by mouth  every 3 hour as needed with low tolerance to increase overnight to 4-6mg by mouth every 3 hour as needed if pain not controlled on 4mg. (hold or reduce dose as needed if patient has s/sx sedation or respiratory depression)  -- Initiate HYDROmorphone 0.2-0.4mg IV every 2 hour as needed breakthrough pain   + If unable to take oral medications:   -- HYDROmorphone (Dilaudid) PCA doses 0.2-0.4 mg Q 10 min lockout interval with NO continuous rate. The PCA doses are for acute postoperative pain. If patient is showing s/sx sedation or respiratory depression, decrease PCA doses as necessary.       Nonopioid analgesics:   -- acetaminophen 1,000 mg PO every 8 hr scheduled   -- gabapentin 600 mg PO every 8 hours scheduled (intentional higher than home dose to control pain)  -- pregabalin 100mg by mouth TID      Muscle Relaxant:   --methocarbamol 500 mg PO Q6 hr PRN muscle spasm OR  --If unable to tolerate PO meds, use Diazepam (Valium) 2.5-5 mg IV Q 6 hr PRN muscle spasm     Stool softeners/Laxatives:   -- When appropriate start senna-docusate 1-2 tabs PO BID and Miralax 17 g daily to prevent opioid induced constipation.      Other:  -- Recommend close monitoring of respiratory status postoperatively with capnography and/or SpO2 monitoring.      -- Ketamine IV infusion. If needed for acute postop uncontrolled pain, the primary service may decide to start ketamine infusion at 5mg/hr (0.6mg/kg/hr based on dry weight of 84 kg). Ketamine should only be started if the patient is cardiovascularly stable.      Low dose ketamine may be administered on a regular nursing unit according to [facility] ketamine-low dose policy. Please see: [email or web address]   Obtain acrylic lockbox from central pharmacy  Obtain portless tubing from [employer] . Order #[id number]  Monitoring:    Vital signs every 4 hours if patient is stable. More frequent  assessments may be indicated if patient is not stable.  Adverse effects:    Psychotomimetic effects (hallucinations or dreamlike feelings) are the most common adverse effect at these low doses, but this adverse event doesn t happen all the time. These effects may be reduced or prevented by co-administration of a benzodiazepine: e.g. lorazepam or diazepam    Dose related and therefore less common at low doses: hypotension or hypertension, excessive sedation    At low doses, ketamine does NOT affect respiratory function.  Discontinuation of Therapy:No formal weaning process for ketamine is needed     The inpatient pain service will follow up on patient after consult is placed and offer further recommendations for pain management. If immediate assistance is needed please contact the pain service at the number below.      Henrik Javier RPH  March 15, 2017  12:07 PM     If questions or concerns, please contact the Inpatient Pain Management Service:  Call [phone] after hours, weekends and holidays.   Page [phone] from 8 AM - 3 PM Mon - Fri.    Reviewed and Signed by PAC Pharmacist  Pharmacist: Henrik Javier PharmD  Date: 3/15/17  Time:1200      Anesthesia Plan      History & Physical Review  History and physical reviewed and following examination; no interval change.    ASA Status:  3 .    NPO Status:  > 8 hours    Plan for General and ETT with Intravenous and Propofol induction. Maintenance will be Balanced.    PONV prophylaxis:  Ondansetron (or other 5HT-3) and Other (See comment)  Additional equipment: 2nd IV and Arterial Line      Postoperative Care  Postoperative pain management:  IV analgesics, Oral pain medications and Multi-modal analgesia.      Consents  Anesthetic plan, risks, benefits and alternatives discussed with:  Patient..                          .

## 2017-03-15 NOTE — MR AVS SNAPSHOT
After Visit Summary   3/15/2017    Jean-Claude Lowry    MRN: [id number]           Patient Information     Date Of Birth          1955        Visit Information        Provider Department      3/15/2017 9:30 AM Pharmacist, Ian Ortiz [facility] Preoperative Assessment [place]        Today's Diagnoses     Preop examination    -  1       Follow-ups after your visit        Your next 10 appointments already scheduled     Mar 15, 2017 11:50 AM CDT   (Arrive by 11:35 AM)   PAC Anesthesia Consult with [facility] Pac Anesthesiologist   [facility] ([facility])    [address]  4th Floor  [place] [place] [facility] [id number] [phone]            Mar 22, 2017   Procedure with Paco Salmeron MD   [facility] [place], Same Day Surgery (--)    [address] [address] [phone]              Who to contact     Please call your clinic at [phone] to:    Ask questions about your health    Make or cancel appointments    Discuss your medicines    Learn about your test results    Speak to your doctor   If you have compliments or concerns about an experience at your clinic, or if you wish to file a complaint, please contact [facility] Patient Relations at [phone] or email us at [email or web address]         Additional Information About Your Visit        MyChart Information     [employer] is an electronic gateway that provides easy, online access to your medical records. With [employer], you can request a clinic appointment, read your test results, renew a prescription or communicate with your care team.     To sign up for [employer] visit the website at [email or web address]   You will be asked to enter the access code listed below, as well as some personal information. Please follow the directions to create your username and password.     Your access code is: [email or web address]  Expires: 4/23/2017  7:30 AM     Your access code  will  in 90 days. If you need help or a new code, please contact your [facility] or call [phone] for assistance.        Care EveryWhere ID     This is your Care EveryWhere ID. This could be used by other organizations to access your [place] medical records  [id number]         Blood Pressure from Last 3 Encounters:   03/15/17 119/72   17 121/66   10/03/16 122/55    Weight from Last 3 Encounters:   03/15/17 84.2 kg (185 lb 11.2 oz)   17 79.9 kg (176 lb 3.2 oz)   10/03/16 82.6 kg (182 lb)              Today, you had the following     No orders found for display       Primary Care Provider Office Phone # Fax #    Quang Mejia [phone]       [facility] [address] [address]        Thank you!     Thank you for choosing [facility]  for your care. Our goal is always to provide you with excellent care. Hearing back from our patients is one way we can continue to improve our services. Please take a few minutes to complete the written survey that you may receive in the mail after your visit with us. Thank you!             Your Updated Medication List - Protect others around you: Learn how to safely use, store and throw away your medicines at www.disposemymeds.org.          This list is accurate as of: 3/15/17 11:45 AM.  Always use your most recent med list.                   Brand Name Dispense Instructions for use    ACIPHEX PO      Take 20 mg by mouth every morning       CYMBALTA PO      Take 60 mg by mouth At Bedtime       GABAPENTIN PO      Take by mouth 3 times daily 600 mg AM, 300 mg afternoon, 600 mg HS       ledipasvir-sofosbuvir  MG per tablet    HARVONI     Take 1 tablet by mouth every morning       LYRICA 100 MG capsule   Generic drug:  pregabalin      Take 100 mg by mouth 3 times daily       SUBOXONE 4-1 MG per film   Generic drug:  buprenorphine HCl-naloxone HCl      Place 1  each under the tongue 2 times daily       TRAZODONE HCL PO      Take 150-300 mg by mouth At Bedtime

## 2017-03-15 NOTE — H&P
"  Pre-Operative H & P     CC:  Preoperative exam to assess for increased cardiopulmonary risk while undergoing surgery and anesthesia.    Date of Encounter: 3/15/2017  Primary Care Physician:  Quang Mejia  Jean-Claude Lowry is a 62 year old male who presents for pre-operative H & P in preparation for cheduled for O-Arm/Stealth Assisted Lumbar 4-5 Decompression, Bilateral Transforaminal Lumbar Interbody Fusion, Pedicle Screw Instrumentation with Dr. Salmeron on 3/22/2017 at [facility] under general anesthesia.  Mr. Lowry has a long history of back issues dating back to the 1980's.  He underwent laminectomies at the L4 and L5 levels in the 1990's.  His second laminectomy was complicated by a dural tear.  Dural tear repair occurred later and resulted in a left foot drop.  Subsequent L5-S1 fusion, with date unknown.  Right S1 joint fusion in 2006.  Records indicate he wore a TLSO from 7115-2588.  His back pain increased about 1.5 years ago and despite medical management, physical therapy and injections, pain continues.  He is followed by Dr Alcantar for addiction medicine and takes suboxone.    He has Hepatitis C and was started on Harvoni \"about a week ago\" and is followed by EBER Smith, at [facility].  He reports he was recently diagnosed with sleep apnea and is scheduled to be fitted for CPAP mask on Friday, 3/17/2017.  About a month ago, he was seen in an OSH ED for hypotension and ACE I was discontinued.    PAC referral for risk assessment and optimization of anesthesia with comorbid conditions of:  H/O HTN; ANNE MARIE; GERD; Hepatitis C; DM II; chronic pain and depression.     History is obtained from the patient and electronic health record.     Past Medical History  Past Medical History   Diagnosis Date     Depressive disorder      Diabetes (H)      Hypertension        Past Surgical History  Past Surgical History   Procedure Laterality Date     " Orthopedic surgery       Laminectomy cervical minimally invasive two levels       L4-L5 in 1990's with later redo complicated by dural tear which resulted in foot drop.       Hx of Blood transfusions/reactions: yes without h/o reaction     Hx of abnormal bleeding or anti-platelet use: denies    Menstrual history: No LMP for male patient.    Steroid use in the last year: denies    Personal or FH with difficulty with Anesthesia:  PONV    Prior to Admission Medications  Current Outpatient Prescriptions   Medication Sig Dispense Refill     ledipasvir-sofosbuvir (HARVONI)  MG per tablet Take 1 tablet by mouth every morning       buprenorphine HCl-naloxone HCl (SUBOXONE) 4-1 MG per film Place 1 each under the tongue 2 times daily        pregabalin (LYRICA) 100 MG capsule Take 100 mg by mouth 3 times daily        RABEprazole Sodium (ACIPHEX PO) Take 20 mg by mouth every morning        DULoxetine HCl (CYMBALTA PO) Take 60 mg by mouth At Bedtime        TRAZODONE HCL PO Take 150-300 mg by mouth At Bedtime       GABAPENTIN PO Take by mouth 3 times daily 600 mg AM, 300 mg afternoon, 600 mg HS         Allergies  No Known Allergies    Social History  Social History     Social History     Marital status:      Spouse name: N/A     Number of children: N/A     Years of education: N/A     Occupational History     Not on file.     Social History Main Topics     Smoking status: Never Smoker     Smokeless tobacco: Not on file      Comment: smokes marijuana daily for pain.     Alcohol use No     Drug use: Yes      Comment: MARIJUANA DAILY FOR PAIN     Sexual activity: Not on file     Other Topics Concern     Not on file     Social History Narrative     for 32 years.    Occupation  now on disability since 2005.    Two adult chidlren.       Family History  History reviewed. No pertinent family history.    ROS/MED HX    ENT/Pulmonary:     (+)ANNE MARIE risk factors (He is in the process of having sleep study.)  "hypertension, , . .    Neurologic:     (+)other neuro Four back surgeries in the past.  Second surgery patient had Dural tear >>> left foot drop that has since resolved with PT.    Cardiovascular:     (+) hypertension-range: 120's/60's, ---. : . . . :. . No previous cardiac testing      (-) taking anticoagulants/antiplatelets   METS/Exercise Tolerance: Comment: Lives on second floor and walks up 17 steps a few times a day.  Walks his dogs. >4 METS   Hematologic:     (+) History of Transfusion no previous transfusion reaction -     (-) history of blood clots and anemia   Musculoskeletal: Comment: H/O right ankle surgery with instrumentation.  (+) , , other musculoskeletal (Right knee pain.)-       GI/Hepatic:     (+) GERD Asymptomatic on medication, hepatitis type C, liver disease (\"scaring of my liver\".  History of ETOH abuse.  Last drink 2 years ago.),       Renal/Genitourinary:      (-) renal disease   Endo:     (+) type II DM (Manages it through diet.) Not using insulin - not using insulin pump Diabetic complications: neuropathy, .      Psychiatric:     (+) psychiatric history depression (Insmonia)      Infectious Disease:  - neg infectious disease ROS       Malignancy:         Other:    (+) No chance of pregnancy C-spine cleared: N/A, H/O Chronic Pain,H/O chronic opiod use , other significant disability Other (comment)         Temp: 98.6  F (37  C) Temp src: Oral BP: 119/72 Pulse: 82   Resp: 16 SpO2: 93 %         185 lbs 11.2 oz  5' 4\"   Body mass index is 31.88 kg/(m^2).       Physical Exam  Constitutional: Awake, alert, cooperative, no apparent distress, and appears stated age.  Eyes: Pupils equal, round and reactive to light, extra ocular muscles intact, sclera clear, conjunctiva normal.  Evidence of right facial droop >>> Patient reports this is normal.   HENT: Normocephalic, oral pharynx with moist mucus membranes, does not have any teeth.  He has dentures, but does not wear as they do not fit. No goiter " appreciated.   Respiratory: Clear to auscultation bilaterally, no crackles or wheezing.  Cardiovascular: Regular rate and rhythm, normal S1 and S2, and no murmur noted.  Carotids +2, no bruits. No edema. Palpable pulses to radial  DP and PT arteries.   GI: Normal bowel sounds, soft, non-distended, non-tender, no masses palpated, no hepatosplenomegaly.  Lymph/Hematologic: No cervical lymphadenopathy and no supraclavicular lymphadenopathy.  Genitourinary:  na  Skin: Warm and dry.  No rashes at anticipated surgical site. Multiple tatoos  Musculoskeletal: Full ROM of neck. There is no redness, warmth, or swelling of the joints. Gross motor strength is less than normal.   Neurologic: Awake, alert, oriented to name, place and time. Cranial nerves II-XII are grossly intact. Altered gait.  Neuropsychiatric: Calm, cooperative. Normal affect.     Labs: (personally reviewed)  Lab Results   Component Value Date    WBC 11.7 03/15/2017     Lab Results   Component Value Date    RBC 4.54 03/15/2017     Lab Results   Component Value Date    HGB 13.4 03/15/2017     Lab Results   Component Value Date    HCT 41.7 03/15/2017     Lab Results   Component Value Date    MCV 92 03/15/2017     Lab Results   Component Value Date    MCH 29.5 03/15/2017     Lab Results   Component Value Date    MCHC 32.1 03/15/2017     Lab Results   Component Value Date    RDW 13.0 03/15/2017     Lab Results   Component Value Date     03/15/2017     Last Basic Metabolic Panel:  Lab Results   Component Value Date     03/15/2017      Lab Results   Component Value Date    POTASSIUM 4.3 03/15/2017     Lab Results   Component Value Date    CHLORIDE 102 03/15/2017     Lab Results   Component Value Date    ZAIRE 8.6 03/15/2017     Lab Results   Component Value Date    CO2 31 03/15/2017     Lab Results   Component Value Date    BUN 17 03/15/2017     Lab Results   Component Value Date    CR 0.86 03/15/2017     Lab Results   Component Value Date      03/15/2017     Lab Results   Component Value Date    AST 12 03/15/2017     Lab Results   Component Value Date    ALT 14 03/15/2017     No results found for: BILICONJ   Lab Results   Component Value Date    BILITOTAL 0.8 03/15/2017     Lab Results   Component Value Date    ALBUMIN 3.2 03/15/2017     Lab Results   Component Value Date    PROTTOTAL 7.5 03/15/2017      Lab Results   Component Value Date    ALKPHOS 82 03/15/2017       Color Urine (no units)   Date Value   03/15/2017 Yellow     Appearance Urine (no units)   Date Value   03/15/2017 Clear     Glucose Urine (mg/dL)   Date Value   03/15/2017 Negative     Bilirubin Urine (no units)   Date Value   03/15/2017 Negative     Ketones Urine (mg/dL)   Date Value   03/15/2017 Negative     Specific Gravity Urine (no units)   Date Value   03/15/2017 1.006     pH Urine (pH)   Date Value   03/15/2017 8.0 (H)     Protein Albumin Urine (mg/dL)   Date Value   03/15/2017 Negative     Nitrite Urine (no units)   Date Value   03/15/2017 Negative     Leukocyte Esterase Urine (no units)   Date Value   03/15/2017 Negative        EKG: does not meet ACC/AHA criteria    MR LUMBAR SPINE W/O CONTRAST 10/14/2016 11:43 AM   Impression:   1. Right subarticular superiorly migrating extrusion superimposed on  posterior disc bulge at L4-5 with compression of the traversing right  L5 nerve root and moderate spinal canal stenosis. This has not  significantly changed from 4/30/2015 dated CT, given the differences  in technique.  2. Unchanged severe left neural foraminal stenosis at L5-S1.      ASSESSMENT and PLAN  Jean-Claude Lowry is a 62 year old male scheduled to undergo O-Arm/Stealth Assisted Lumbar 4-5 Decompression, Bilateral Transforaminal Lumbar Interbody Fusion, Pedicle Screw Instrumentation with Dr. Salmeron on 3/22/2017 at [facility] under general anesthesia.          Cardiology - RCRI : No serious cardiac risks.  0.4% risk of major adverse  cardiac event. METS >4.  No cardiac history. Denies any chest pain, dyspnea, or lightheadedness.  Pulmonary -  No cigarette smoking.  Smokes marijuana daily. Recently diagnosed with ANNE MARIE>>>will be fitted with CPAP mask 3/17/2017>>>instructed to bring CPAP with DOS.  GI - GERD, take PPI DOD.  Hepatitis C>>>started Harvoni on 3/4/2017 with end date of 5/27/17 followed by Dr. Adolfo Ventura at [facility].  Albumin 3.2.  Endocrine - DM II, diet managed.  Hgb 6.2 today.  Musculoskeletal - Recommend careful positioning given generalized chronic pain, right knee pain, h/o of right ankle surgery with instrumentation and h/o foot drop in LLE      He has the following specific operative considerations:   - Anesthesia considerations:  Refer to PAC assessment in anesthesia records  - VTE risk:  1.8%  - Known PONV, anti-emetic intervention recommended.  - Chronic pain followed by Dr. Alcantar at [facility].  Patient is on Suboxone.  Please see Henrik Myers's note (PAC pharmacist) regarding pain management. Spokes marijuana daily for pain.      Arrival time, NPO, shower and medication instructions provided by nursing staff today.  Preparing For Your Surgery handout given.  Patient was discussed with Dr Reyna. I spent 20 minutes face to face with patient, assessing, examining, and educating.    .         SADA Sinclair CNP  Preoperative Assessment Center  [facility]  Cl[facility] and [facility]  Phone: [phone]  Fax: [phone]

## 2017-03-20 RX ORDER — CEFAZOLIN SODIUM 2 G/100ML
2 INJECTION, SOLUTION INTRAVENOUS
Status: CANCELLED | OUTPATIENT
Start: 2017-03-20

## 2017-03-20 RX ORDER — CEFAZOLIN SODIUM 1 G/3ML
1 INJECTION, POWDER, FOR SOLUTION INTRAMUSCULAR; INTRAVENOUS SEE ADMIN INSTRUCTIONS
Status: CANCELLED | OUTPATIENT
Start: 2017-03-20

## 2017-03-22 ENCOUNTER — HOSPITAL ENCOUNTER (INPATIENT)
Facility: CLINIC | Age: 62
LOS: 3 days | Discharge: HOME HEALTH | DRG: 460 | End: 2017-03-25
Attending: NEUROLOGICAL SURGERY | Admitting: NEUROLOGICAL SURGERY
Payer: MEDICARE

## 2017-03-22 ENCOUNTER — ANESTHESIA (OUTPATIENT)
Dept: SURGERY | Facility: CLINIC | Age: 62
DRG: 460 | End: 2017-03-22
Payer: MEDICARE

## 2017-03-22 ENCOUNTER — APPOINTMENT (OUTPATIENT)
Dept: GENERAL RADIOLOGY | Facility: CLINIC | Age: 62
DRG: 460 | End: 2017-03-22
Attending: NEUROLOGICAL SURGERY
Payer: MEDICARE

## 2017-03-22 DIAGNOSIS — M48.061 SPINAL STENOSIS OF LUMBAR REGION WITH RADICULOPATHY: ICD-10-CM

## 2017-03-22 DIAGNOSIS — Z98.1 S/P SPINAL FUSION: Primary | ICD-10-CM

## 2017-03-22 DIAGNOSIS — M43.16 SPONDYLOLISTHESIS OF LUMBAR REGION: ICD-10-CM

## 2017-03-22 DIAGNOSIS — M54.16 SPINAL STENOSIS OF LUMBAR REGION WITH RADICULOPATHY: ICD-10-CM

## 2017-03-22 LAB
ABO + RH BLD: NORMAL
ABO + RH BLD: NORMAL
BASOPHILS # BLD AUTO: 0.1 10E9/L (ref 0–0.2)
BASOPHILS NFR BLD AUTO: 0.6 %
BLD GP AB SCN SERPL QL: NORMAL
BLOOD BANK CMNT PATIENT-IMP: NORMAL
BLOOD BANK CMNT PATIENT-IMP: NORMAL
DIFFERENTIAL METHOD BLD: NORMAL
EOSINOPHIL # BLD AUTO: 0.2 10E9/L (ref 0–0.7)
EOSINOPHIL NFR BLD AUTO: 1.8 %
ERYTHROCYTE [DISTWIDTH] IN BLOOD BY AUTOMATED COUNT: 13.4 % (ref 10–15)
GLUCOSE BLDC GLUCOMTR-MCNC: 118 MG/DL (ref 70–99)
GLUCOSE BLDC GLUCOMTR-MCNC: 127 MG/DL (ref 70–99)
HCT VFR BLD AUTO: 43.7 % (ref 40–53)
HGB BLD-MCNC: 14.5 G/DL (ref 13.3–17.7)
IMM GRANULOCYTES # BLD: 0 10E9/L (ref 0–0.4)
IMM GRANULOCYTES NFR BLD: 0.2 %
LYMPHOCYTES # BLD AUTO: 2.6 10E9/L (ref 0.8–5.3)
LYMPHOCYTES NFR BLD AUTO: 31.1 %
MCH RBC QN AUTO: 29.8 PG (ref 26.5–33)
MCHC RBC AUTO-ENTMCNC: 33.2 G/DL (ref 31.5–36.5)
MCV RBC AUTO: 90 FL (ref 78–100)
MONOCYTES # BLD AUTO: 0.7 10E9/L (ref 0–1.3)
MONOCYTES NFR BLD AUTO: 8.1 %
NEUTROPHILS # BLD AUTO: 4.9 10E9/L (ref 1.6–8.3)
NEUTROPHILS NFR BLD AUTO: 58.2 %
NRBC # BLD AUTO: 0 10*3/UL
NRBC BLD AUTO-RTO: 0 /100
PLATELET # BLD AUTO: 153 10E9/L (ref 150–450)
RBC # BLD AUTO: 4.86 10E12/L (ref 4.4–5.9)
SPECIMEN EXP DATE BLD: NORMAL
WBC # BLD AUTO: 8.4 10E9/L (ref 4–11)

## 2017-03-22 PROCEDURE — 27211024 ZZHC OR SUPPLY OTHER OPNP: Performed by: NEUROLOGICAL SURGERY

## 2017-03-22 PROCEDURE — 27810322 ZZHC OR SPINE - CAGE/SPACER/DISK/CORD/CONNECTOR OPNP: Performed by: NEUROLOGICAL SURGERY

## 2017-03-22 PROCEDURE — 71000015 ZZH RECOVERY PHASE 1 LEVEL 2 EA ADDTL HR: Performed by: NEUROLOGICAL SURGERY

## 2017-03-22 PROCEDURE — 25000128 H RX IP 250 OP 636: Performed by: STUDENT IN AN ORGANIZED HEALTH CARE EDUCATION/TRAINING PROGRAM

## 2017-03-22 PROCEDURE — C1762 CONN TISS, HUMAN(INC FASCIA): HCPCS | Performed by: NEUROLOGICAL SURGERY

## 2017-03-22 PROCEDURE — 25000125 ZZHC RX 250: Performed by: STUDENT IN AN ORGANIZED HEALTH CARE EDUCATION/TRAINING PROGRAM

## 2017-03-22 PROCEDURE — 40000277 XR SURGERY CARM FLUORO LESS THAN 5 MIN W STILLS: Mod: TC

## 2017-03-22 PROCEDURE — 25800025 ZZH RX 258: Performed by: STUDENT IN AN ORGANIZED HEALTH CARE EDUCATION/TRAINING PROGRAM

## 2017-03-22 PROCEDURE — 71000014 ZZH RECOVERY PHASE 1 LEVEL 2 FIRST HR: Performed by: NEUROLOGICAL SURGERY

## 2017-03-22 PROCEDURE — 00000146 ZZHCL STATISTIC GLUCOSE BY METER IP

## 2017-03-22 PROCEDURE — 85025 COMPLETE CBC W/AUTO DIFF WBC: CPT | Performed by: NEUROLOGICAL SURGERY

## 2017-03-22 PROCEDURE — C9399 UNCLASSIFIED DRUGS OR BIOLOG: HCPCS | Performed by: STUDENT IN AN ORGANIZED HEALTH CARE EDUCATION/TRAINING PROGRAM

## 2017-03-22 PROCEDURE — 25000128 H RX IP 250 OP 636: Performed by: NEUROLOGICAL SURGERY

## 2017-03-22 PROCEDURE — 8E0WXBG COMPUTER ASSISTED PROCEDURE OF TRUNK REGION, WITH COMPUTERIZED TOMOGRAPHY: ICD-10-PCS | Performed by: NEUROLOGICAL SURGERY

## 2017-03-22 PROCEDURE — 37000008 ZZH ANESTHESIA TECHNICAL FEE, 1ST 30 MIN: Performed by: NEUROLOGICAL SURGERY

## 2017-03-22 PROCEDURE — 37000009 ZZH ANESTHESIA TECHNICAL FEE, EACH ADDTL 15 MIN: Performed by: NEUROLOGICAL SURGERY

## 2017-03-22 PROCEDURE — 0SG00Z1 FUSION OF LUMBAR VERTEBRAL JOINT, POSTERIOR APPROACH, POSTERIOR COLUMN, OPEN APPROACH: ICD-10-PCS | Performed by: NEUROLOGICAL SURGERY

## 2017-03-22 PROCEDURE — 36000078 ZZH SURGERY LEVEL 6 W FLUORO 1ST 30 MIN - UMMC: Performed by: NEUROLOGICAL SURGERY

## 2017-03-22 PROCEDURE — 0SB20ZZ EXCISION OF LUMBAR VERTEBRAL DISC, OPEN APPROACH: ICD-10-PCS | Performed by: NEUROLOGICAL SURGERY

## 2017-03-22 PROCEDURE — 36415 COLL VENOUS BLD VENIPUNCTURE: CPT | Performed by: NEUROLOGICAL SURGERY

## 2017-03-22 PROCEDURE — 25000125 ZZHC RX 250: Performed by: NEUROLOGICAL SURGERY

## 2017-03-22 PROCEDURE — 27210794 ZZH OR GENERAL SUPPLY STERILE: Performed by: NEUROLOGICAL SURGERY

## 2017-03-22 PROCEDURE — 12000003 ZZH R&B CRITICAL UMMC

## 2017-03-22 PROCEDURE — 0SG00AJ FUSION OF LUMBAR VERTEBRAL JOINT WITH INTERBODY FUSION DEVICE, POSTERIOR APPROACH, ANTERIOR COLUMN, OPEN APPROACH: ICD-10-PCS | Performed by: NEUROLOGICAL SURGERY

## 2017-03-22 PROCEDURE — 27210995 ZZH RX 272: Performed by: NEUROLOGICAL SURGERY

## 2017-03-22 PROCEDURE — 25000132 ZZH RX MED GY IP 250 OP 250 PS 637: Mod: GY | Performed by: STUDENT IN AN ORGANIZED HEALTH CARE EDUCATION/TRAINING PROGRAM

## 2017-03-22 PROCEDURE — 40000170 ZZH STATISTIC PRE-PROCEDURE ASSESSMENT II: Performed by: NEUROLOGICAL SURGERY

## 2017-03-22 PROCEDURE — 25000132 ZZH RX MED GY IP 250 OP 250 PS 637: Mod: GY | Performed by: NEUROLOGICAL SURGERY

## 2017-03-22 PROCEDURE — 0SR20JZ REPLACEMENT OF LUMBAR VERTEBRAL DISC WITH SYNTHETIC SUBSTITUTE, OPEN APPROACH: ICD-10-PCS | Performed by: NEUROLOGICAL SURGERY

## 2017-03-22 PROCEDURE — 36000076 ZZH SURGERY LEVEL 6 EA 15 ADDTL MIN - UMMC: Performed by: NEUROLOGICAL SURGERY

## 2017-03-22 PROCEDURE — A9270 NON-COVERED ITEM OR SERVICE: HCPCS | Mod: GY | Performed by: STUDENT IN AN ORGANIZED HEALTH CARE EDUCATION/TRAINING PROGRAM

## 2017-03-22 PROCEDURE — 25000566 ZZH SEVOFLURANE, EA 15 MIN: Performed by: NEUROLOGICAL SURGERY

## 2017-03-22 DEVICE — GRAFT BONE CRUSH CANC 30ML 400080: Type: IMPLANTABLE DEVICE | Site: SPINE LUMBAR | Status: FUNCTIONAL

## 2017-03-22 DEVICE — IMPLANTABLE DEVICE: Type: IMPLANTABLE DEVICE | Site: SPINE LUMBAR | Status: FUNCTIONAL

## 2017-03-22 RX ORDER — TRAZODONE HYDROCHLORIDE 150 MG/1
150-300 TABLET ORAL AT BEDTIME
Status: DISCONTINUED | OUTPATIENT
Start: 2017-03-22 | End: 2017-03-25 | Stop reason: HOSPADM

## 2017-03-22 RX ORDER — PROCHLORPERAZINE MALEATE 5 MG
5-10 TABLET ORAL EVERY 6 HOURS PRN
Status: DISCONTINUED | OUTPATIENT
Start: 2017-03-22 | End: 2017-03-25 | Stop reason: HOSPADM

## 2017-03-22 RX ORDER — FENTANYL CITRATE 50 UG/ML
25-50 INJECTION, SOLUTION INTRAMUSCULAR; INTRAVENOUS
Status: DISCONTINUED | OUTPATIENT
Start: 2017-03-22 | End: 2017-03-22 | Stop reason: HOSPADM

## 2017-03-22 RX ORDER — DEXMEDETOMIDINE HYDROCHLORIDE 4 UG/ML
0.2-1.2 INJECTION, SOLUTION INTRAVENOUS CONTINUOUS
Status: DISCONTINUED | OUTPATIENT
Start: 2017-03-22 | End: 2017-03-22 | Stop reason: HOSPADM

## 2017-03-22 RX ORDER — HYDROMORPHONE HYDROCHLORIDE 1 MG/ML
.3-.5 INJECTION, SOLUTION INTRAMUSCULAR; INTRAVENOUS; SUBCUTANEOUS EVERY 5 MIN PRN
Status: DISCONTINUED | OUTPATIENT
Start: 2017-03-22 | End: 2017-03-22 | Stop reason: HOSPADM

## 2017-03-22 RX ORDER — KETAMINE HYDROCHLORIDE 10 MG/ML
5 INJECTION, SOLUTION INTRAMUSCULAR; INTRAVENOUS EVERY 4 HOURS PRN
Status: DISCONTINUED | OUTPATIENT
Start: 2017-03-22 | End: 2017-03-23

## 2017-03-22 RX ORDER — HYDROMORPHONE HYDROCHLORIDE 2 MG/1
2-4 TABLET ORAL
Status: DISCONTINUED | OUTPATIENT
Start: 2017-03-22 | End: 2017-03-22

## 2017-03-22 RX ORDER — BUPIVACAINE HYDROCHLORIDE AND EPINEPHRINE 2.5; 5 MG/ML; UG/ML
INJECTION, SOLUTION INFILTRATION; PERINEURAL PRN
Status: DISCONTINUED | OUTPATIENT
Start: 2017-03-22 | End: 2017-03-22 | Stop reason: HOSPADM

## 2017-03-22 RX ORDER — AMOXICILLIN 250 MG
3 CAPSULE ORAL 2 TIMES DAILY
Status: DISCONTINUED | OUTPATIENT
Start: 2017-03-22 | End: 2017-03-25 | Stop reason: HOSPADM

## 2017-03-22 RX ORDER — DULOXETIN HYDROCHLORIDE 60 MG/1
60 CAPSULE, DELAYED RELEASE ORAL AT BEDTIME
Status: DISCONTINUED | OUTPATIENT
Start: 2017-03-22 | End: 2017-03-25 | Stop reason: HOSPADM

## 2017-03-22 RX ORDER — DOCUSATE SODIUM 100 MG/1
100 CAPSULE, LIQUID FILLED ORAL 2 TIMES DAILY
Status: DISCONTINUED | OUTPATIENT
Start: 2017-03-22 | End: 2017-03-25 | Stop reason: HOSPADM

## 2017-03-22 RX ORDER — SODIUM CHLORIDE, SODIUM LACTATE, POTASSIUM CHLORIDE, CALCIUM CHLORIDE 600; 310; 30; 20 MG/100ML; MG/100ML; MG/100ML; MG/100ML
INJECTION, SOLUTION INTRAVENOUS CONTINUOUS PRN
Status: DISCONTINUED | OUTPATIENT
Start: 2017-03-22 | End: 2017-03-22

## 2017-03-22 RX ORDER — LEDIPASVIR AND SOFOSBUVIR 90; 400 MG/1; MG/1
1 TABLET, FILM COATED ORAL EVERY MORNING
Status: DISCONTINUED | OUTPATIENT
Start: 2017-03-23 | End: 2017-03-25 | Stop reason: HOSPADM

## 2017-03-22 RX ORDER — CEFAZOLIN SODIUM 1 G/3ML
1 INJECTION, POWDER, FOR SOLUTION INTRAMUSCULAR; INTRAVENOUS SEE ADMIN INSTRUCTIONS
Status: DISCONTINUED | OUTPATIENT
Start: 2017-03-22 | End: 2017-03-22 | Stop reason: HOSPADM

## 2017-03-22 RX ORDER — METHOCARBAMOL 750 MG/1
750 TABLET, FILM COATED ORAL EVERY 6 HOURS PRN
Status: DISCONTINUED | OUTPATIENT
Start: 2017-03-22 | End: 2017-03-22

## 2017-03-22 RX ORDER — KETAMINE HYDROCHLORIDE 10 MG/ML
INJECTION, SOLUTION INTRAMUSCULAR; INTRAVENOUS PRN
Status: DISCONTINUED | OUTPATIENT
Start: 2017-03-22 | End: 2017-03-22

## 2017-03-22 RX ORDER — PANTOPRAZOLE SODIUM 40 MG/1
40 TABLET, DELAYED RELEASE ORAL EVERY MORNING
Status: DISCONTINUED | OUTPATIENT
Start: 2017-03-23 | End: 2017-03-25 | Stop reason: HOSPADM

## 2017-03-22 RX ORDER — SODIUM CHLORIDE 9 MG/ML
INJECTION, SOLUTION INTRAVENOUS CONTINUOUS
Status: DISCONTINUED | OUTPATIENT
Start: 2017-03-22 | End: 2017-03-25 | Stop reason: HOSPADM

## 2017-03-22 RX ORDER — CEFAZOLIN SODIUM 2 G/100ML
2 INJECTION, SOLUTION INTRAVENOUS
Status: COMPLETED | OUTPATIENT
Start: 2017-03-22 | End: 2017-03-22

## 2017-03-22 RX ORDER — ONDANSETRON 2 MG/ML
4 INJECTION INTRAMUSCULAR; INTRAVENOUS EVERY 6 HOURS PRN
Status: DISCONTINUED | OUTPATIENT
Start: 2017-03-22 | End: 2017-03-25 | Stop reason: HOSPADM

## 2017-03-22 RX ORDER — GABAPENTIN 300 MG/1
900 CAPSULE ORAL ONCE
Status: DISCONTINUED | OUTPATIENT
Start: 2017-03-22 | End: 2017-03-22 | Stop reason: HOSPADM

## 2017-03-22 RX ORDER — KETAMINE HYDROCHLORIDE 10 MG/ML
5 INJECTION, SOLUTION INTRAMUSCULAR; INTRAVENOUS EVERY 4 HOURS PRN
Status: DISCONTINUED | OUTPATIENT
Start: 2017-03-22 | End: 2017-03-22

## 2017-03-22 RX ORDER — ONDANSETRON 4 MG/1
4 TABLET, ORALLY DISINTEGRATING ORAL EVERY 6 HOURS PRN
Status: DISCONTINUED | OUTPATIENT
Start: 2017-03-22 | End: 2017-03-25 | Stop reason: HOSPADM

## 2017-03-22 RX ORDER — EPHEDRINE SULFATE 50 MG/ML
INJECTION, SOLUTION INTRAMUSCULAR; INTRAVENOUS; SUBCUTANEOUS PRN
Status: DISCONTINUED | OUTPATIENT
Start: 2017-03-22 | End: 2017-03-22

## 2017-03-22 RX ORDER — LIDOCAINE HYDROCHLORIDE 20 MG/ML
INJECTION, SOLUTION INFILTRATION; PERINEURAL PRN
Status: DISCONTINUED | OUTPATIENT
Start: 2017-03-22 | End: 2017-03-22

## 2017-03-22 RX ORDER — PREGABALIN 100 MG/1
100 CAPSULE ORAL 3 TIMES DAILY
Status: CANCELLED | OUTPATIENT
Start: 2017-03-22

## 2017-03-22 RX ORDER — GABAPENTIN 300 MG/1
600 CAPSULE ORAL 3 TIMES DAILY
Status: DISCONTINUED | OUTPATIENT
Start: 2017-03-22 | End: 2017-03-23

## 2017-03-22 RX ORDER — PROPOFOL 10 MG/ML
INJECTION, EMULSION INTRAVENOUS CONTINUOUS PRN
Status: DISCONTINUED | OUTPATIENT
Start: 2017-03-22 | End: 2017-03-22

## 2017-03-22 RX ORDER — SODIUM CHLORIDE, SODIUM LACTATE, POTASSIUM CHLORIDE, CALCIUM CHLORIDE 600; 310; 30; 20 MG/100ML; MG/100ML; MG/100ML; MG/100ML
INJECTION, SOLUTION INTRAVENOUS CONTINUOUS
Status: DISCONTINUED | OUTPATIENT
Start: 2017-03-22 | End: 2017-03-22 | Stop reason: HOSPADM

## 2017-03-22 RX ORDER — LIDOCAINE 40 MG/G
CREAM TOPICAL
Status: DISCONTINUED | OUTPATIENT
Start: 2017-03-22 | End: 2017-03-25 | Stop reason: HOSPADM

## 2017-03-22 RX ORDER — NALOXONE HYDROCHLORIDE 0.4 MG/ML
.1-.4 INJECTION, SOLUTION INTRAMUSCULAR; INTRAVENOUS; SUBCUTANEOUS
Status: DISCONTINUED | OUTPATIENT
Start: 2017-03-22 | End: 2017-03-25 | Stop reason: HOSPADM

## 2017-03-22 RX ORDER — ONDANSETRON 2 MG/ML
INJECTION INTRAMUSCULAR; INTRAVENOUS PRN
Status: DISCONTINUED | OUTPATIENT
Start: 2017-03-22 | End: 2017-03-22

## 2017-03-22 RX ORDER — PROPOFOL 10 MG/ML
INJECTION, EMULSION INTRAVENOUS PRN
Status: DISCONTINUED | OUTPATIENT
Start: 2017-03-22 | End: 2017-03-22

## 2017-03-22 RX ORDER — FENTANYL CITRATE 50 UG/ML
INJECTION, SOLUTION INTRAMUSCULAR; INTRAVENOUS PRN
Status: DISCONTINUED | OUTPATIENT
Start: 2017-03-22 | End: 2017-03-22

## 2017-03-22 RX ORDER — AMOXICILLIN 250 MG
1 CAPSULE ORAL AT BEDTIME
Status: DISCONTINUED | OUTPATIENT
Start: 2017-03-22 | End: 2017-03-22

## 2017-03-22 RX ORDER — ACETAMINOPHEN 325 MG/1
975 TABLET ORAL ONCE
Status: DISCONTINUED | OUTPATIENT
Start: 2017-03-22 | End: 2017-03-22 | Stop reason: HOSPADM

## 2017-03-22 RX ORDER — LABETALOL HYDROCHLORIDE 5 MG/ML
10 INJECTION, SOLUTION INTRAVENOUS
Status: DISCONTINUED | OUTPATIENT
Start: 2017-03-22 | End: 2017-03-22 | Stop reason: HOSPADM

## 2017-03-22 RX ORDER — METHOCARBAMOL 500 MG/1
500 TABLET, FILM COATED ORAL EVERY 6 HOURS PRN
Status: DISCONTINUED | OUTPATIENT
Start: 2017-03-22 | End: 2017-03-22

## 2017-03-22 RX ORDER — GABAPENTIN 100 MG/1
300 CAPSULE ORAL 3 TIMES DAILY
Status: DISCONTINUED | OUTPATIENT
Start: 2017-03-22 | End: 2017-03-22

## 2017-03-22 RX ORDER — BUPRENORPHINE AND NALOXONE 4; 1 MG/1; MG/1
1 FILM, SOLUBLE BUCCAL; SUBLINGUAL 2 TIMES DAILY
Status: DISCONTINUED | OUTPATIENT
Start: 2017-03-22 | End: 2017-03-23

## 2017-03-22 RX ORDER — ACETAMINOPHEN 500 MG
1000 TABLET ORAL EVERY 8 HOURS
Status: DISCONTINUED | OUTPATIENT
Start: 2017-03-22 | End: 2017-03-25 | Stop reason: HOSPADM

## 2017-03-22 RX ORDER — PREGABALIN 100 MG/1
100 CAPSULE ORAL 3 TIMES DAILY
Status: DISCONTINUED | OUTPATIENT
Start: 2017-03-22 | End: 2017-03-23

## 2017-03-22 RX ORDER — HYDROMORPHONE HCL/0.9% NACL/PF 0.2MG/0.2
.2-.4 SYRINGE (ML) INTRAVENOUS
Status: DISCONTINUED | OUTPATIENT
Start: 2017-03-22 | End: 2017-03-22

## 2017-03-22 RX ADMIN — FENTANYL CITRATE 100 MCG: 50 INJECTION, SOLUTION INTRAMUSCULAR; INTRAVENOUS at 09:54

## 2017-03-22 RX ADMIN — DOCUSATE SODIUM 100 MG: 100 CAPSULE, LIQUID FILLED ORAL at 21:11

## 2017-03-22 RX ADMIN — GABAPENTIN 600 MG: 300 CAPSULE ORAL at 21:11

## 2017-03-22 RX ADMIN — KETAMINE HYDROCHLORIDE 5 MG/HR: 100 INJECTION, SOLUTION, CONCENTRATE INTRAMUSCULAR; INTRAVENOUS at 19:37

## 2017-03-22 RX ADMIN — TRAZODONE HYDROCHLORIDE 300 MG: 150 TABLET ORAL at 22:46

## 2017-03-22 RX ADMIN — LIDOCAINE HYDROCHLORIDE 80 MG: 20 INJECTION, SOLUTION INFILTRATION; PERINEURAL at 09:54

## 2017-03-22 RX ADMIN — CEFAZOLIN SODIUM 2 G: 2 INJECTION, SOLUTION INTRAVENOUS at 10:15

## 2017-03-22 RX ADMIN — PROPOFOL 150 MCG/KG/MIN: 10 INJECTION, EMULSION INTRAVENOUS at 10:22

## 2017-03-22 RX ADMIN — HYDROMORPHONE HYDROCHLORIDE 0.5 MG: 10 INJECTION, SOLUTION INTRAMUSCULAR; INTRAVENOUS; SUBCUTANEOUS at 14:55

## 2017-03-22 RX ADMIN — Medication 100 MCG/HR: at 10:18

## 2017-03-22 RX ADMIN — ROCURONIUM BROMIDE 20 MG: 10 INJECTION INTRAVENOUS at 11:21

## 2017-03-22 RX ADMIN — DULOXETINE 60 MG: 60 CAPSULE, DELAYED RELEASE ORAL at 22:46

## 2017-03-22 RX ADMIN — ROCURONIUM BROMIDE 50 MG: 10 INJECTION INTRAVENOUS at 09:54

## 2017-03-22 RX ADMIN — SODIUM CHLORIDE: 9 INJECTION, SOLUTION INTRAVENOUS at 15:29

## 2017-03-22 RX ADMIN — GABAPENTIN 600 MG: 300 CAPSULE ORAL at 16:42

## 2017-03-22 RX ADMIN — HYDROMORPHONE HYDROCHLORIDE 0.5 MG: 10 INJECTION, SOLUTION INTRAMUSCULAR; INTRAVENOUS; SUBCUTANEOUS at 15:03

## 2017-03-22 RX ADMIN — PREGABALIN 100 MG: 100 CAPSULE ORAL at 16:42

## 2017-03-22 RX ADMIN — DEXMEDETOMIDINE HYDROCHLORIDE 0.2 MCG/KG/HR: 4 INJECTION, SOLUTION INTRAVENOUS at 13:33

## 2017-03-22 RX ADMIN — METHOCARBAMOL 500 MG: 500 TABLET ORAL at 16:43

## 2017-03-22 RX ADMIN — Medication 5 MG: at 21:19

## 2017-03-22 RX ADMIN — FENTANYL CITRATE 50 MCG: 50 INJECTION, SOLUTION INTRAMUSCULAR; INTRAVENOUS at 14:48

## 2017-03-22 RX ADMIN — FENTANYL CITRATE 50 MCG: 50 INJECTION, SOLUTION INTRAMUSCULAR; INTRAVENOUS at 14:31

## 2017-03-22 RX ADMIN — KETAMINE HYDROCHLORIDE 10 MG: 10 INJECTION, SOLUTION INTRAMUSCULAR; INTRAVENOUS at 11:00

## 2017-03-22 RX ADMIN — CEFAZOLIN 1 G: 1 INJECTION, POWDER, FOR SOLUTION INTRAMUSCULAR; INTRAVENOUS at 12:05

## 2017-03-22 RX ADMIN — HYDROMORPHONE HYDROCHLORIDE 0.5 MG: 10 INJECTION, SOLUTION INTRAMUSCULAR; INTRAVENOUS; SUBCUTANEOUS at 15:32

## 2017-03-22 RX ADMIN — ROCURONIUM BROMIDE 20 MG: 10 INJECTION INTRAVENOUS at 12:50

## 2017-03-22 RX ADMIN — Medication 5 MG: at 11:59

## 2017-03-22 RX ADMIN — ROCURONIUM BROMIDE 20 MG: 10 INJECTION INTRAVENOUS at 10:40

## 2017-03-22 RX ADMIN — KETAMINE HYDROCHLORIDE 10 MG: 10 INJECTION, SOLUTION INTRAMUSCULAR; INTRAVENOUS at 12:00

## 2017-03-22 RX ADMIN — Medication 5 MG: at 17:29

## 2017-03-22 RX ADMIN — FENTANYL CITRATE 100 MCG: 50 INJECTION, SOLUTION INTRAMUSCULAR; INTRAVENOUS at 10:15

## 2017-03-22 RX ADMIN — SODIUM CHLORIDE, POTASSIUM CHLORIDE, SODIUM LACTATE AND CALCIUM CHLORIDE: 600; 310; 30; 20 INJECTION, SOLUTION INTRAVENOUS at 09:42

## 2017-03-22 RX ADMIN — ONDANSETRON 6 MG: 2 INJECTION INTRAMUSCULAR; INTRAVENOUS at 13:34

## 2017-03-22 RX ADMIN — PREGABALIN 100 MG: 100 CAPSULE ORAL at 21:11

## 2017-03-22 RX ADMIN — PROPOFOL 160 MG: 10 INJECTION, EMULSION INTRAVENOUS at 09:54

## 2017-03-22 RX ADMIN — PROPOFOL 40 MG: 10 INJECTION, EMULSION INTRAVENOUS at 09:57

## 2017-03-22 RX ADMIN — KETAMINE HYDROCHLORIDE 40 MG: 10 INJECTION, SOLUTION INTRAMUSCULAR; INTRAVENOUS at 10:00

## 2017-03-22 RX ADMIN — SUGAMMADEX 50 MG: 100 INJECTION, SOLUTION INTRAVENOUS at 13:57

## 2017-03-22 RX ADMIN — ROCURONIUM BROMIDE 20 MG: 10 INJECTION INTRAVENOUS at 12:02

## 2017-03-22 RX ADMIN — KETAMINE HYDROCHLORIDE 10 MG: 10 INJECTION, SOLUTION INTRAMUSCULAR; INTRAVENOUS at 13:00

## 2017-03-22 RX ADMIN — CEFAZOLIN 1 G: 1 INJECTION, POWDER, FOR SOLUTION INTRAMUSCULAR; INTRAVENOUS at 14:00

## 2017-03-22 RX ADMIN — FENTANYL CITRATE 50 MCG: 50 INJECTION, SOLUTION INTRAMUSCULAR; INTRAVENOUS at 14:56

## 2017-03-22 RX ADMIN — HYDROMORPHONE HYDROCHLORIDE 0.5 MG: 10 INJECTION, SOLUTION INTRAMUSCULAR; INTRAVENOUS; SUBCUTANEOUS at 14:48

## 2017-03-22 RX ADMIN — FENTANYL CITRATE 50 MCG: 50 INJECTION, SOLUTION INTRAMUSCULAR; INTRAVENOUS at 15:32

## 2017-03-22 RX ADMIN — ONDANSETRON 4 MG: 2 INJECTION INTRAMUSCULAR; INTRAVENOUS at 16:43

## 2017-03-22 ASSESSMENT — VISUAL ACUITY
OU: NORMAL ACUITY

## 2017-03-22 NOTE — IP AVS SNAPSHOT
MRN:[id number]                      After Visit Summary   3/22/2017    Jean-Claude Lowry    MRN: [id number]           Thank you!     Thank you for choosing [facility] for your care. Our goal is always to provide you with excellent care. Hearing back from our patients is one way we can continue to improve our services. Please take a few minutes to complete the written survey that you may receive in the mail after you visit with us. Thank you!        Patient Information     Date Of Birth          1955        About your hospital stay     You were admitted on:  March 22, 2017 You last received care in the:  [address] [facility]    You were discharged on:  March 25, 2017        Reason for your hospital stay       Lumbar fusion surgery                  Who to Call     For medical emergencies, please call 911.  For non-urgent questions about your medical care, please call your primary care provider or clinic, [phone]  For questions related to your surgery, please call your surgery clinic        Attending Provider     Provider Specialty    Paco Salmeron MD Neurosurgery       Primary Care Provider Office Phone # Fax #    Quang Mejia [phone]       [facility] [address] [place] [id number]        After Care Instructions     Activity - Up with assistive device           Activity - Up with nursing assistance           Additional Discharge Instructions       You underwent surgery on your  lumbar spine for fusion by Paco Salmeron MD.    - You will have follow in 2 weeks with either our nurse practitioner or your  primary care provider for a wound check, then at 6 weeks and 3 months with your surgeon. You will have follow up Xrays at 6 weeks and at 3 months you will have a CT scan prior to your appointment for the surgeon to review.      - If you have not heard from our clinic about your follow up visit by 3-4 days following your discharge, please call our  clinic at [phone] to schedule an appointment with the Neurosurgery teams.     - Please avoid taking medications like Advil, Motrin, Ibuprofen, Celebrex, Aleve or aspirin for at least 3 months as these medications can slow the healing and fusion of your     After discharge, your activity restrictions are:   -We encourage short frequent walks, increasing as tolerated.  - Avoid housework, vacuuming, laundry, leaf raking, lawn mowing and snow removal.   - No driving until you are seen in clinic and cleared by your neurosurgeon. You should not drive while on narcotic pain medication.   - No strenuous activity.  - No lifting more than 10 pounds until you are seen in clinic (a gallon of milk weighs approximately 8 pounds)  - You are ok to shower, but do not soak your incisions. Pat them dry if they get damp.   - Avoid coloring your hair or permanent styling until cleared by your surgeon  - No baths, hot tubs or pools for 4-6 weeks after surgery.         Wound cares after surgery  - You are ok to shower, but do not soak your incisions. Pat them dry if they get damp.   - Avoid coloring your hair or permanent styling until cleared by your surgeon  - No baths, hot tubs or pools for 4-6 weeks after surgery.       Call if you have any of the followin. Temperature greater than 101.5 F.   2. Any redness, swelling or discharge from the wound.   3. Any new weakness, numbness or altered mental status.  4. Worsening pain that is not improving with the pain medications you were prescribed.     Call [phone] or after 5:00 pm or on weekends call [phone] and ask for the neurosurgery resident on call. Thank You.            Advance Diet as Tolerated       Follow this diet upon discharge: Orders Placed This Encounter      Advance Diet as Tolerated: Regular Diet Adult; Regular Diet Adult            Fall precautions           General info for SNF       Length of Stay Estimate: Short Term Care: Estimated # of Days  <30  Condition at Discharge: Improving  Level of care:skilled   Rehabilitation Potential: Excellent  Admission H&P remains valid and up-to-date: Yes  Recent Chemotherapy: N/A  Use Nursing Home Standing Orders: Yes            Mantoux instructions       Give two-step Mantoux (PPD) Per Facility Policy Yes                  Follow-up Appointments     Follow Up and recommended labs and tests       Follow up with Ramona Mayer PA-C in 2 weeks for wound check    Follow up with Dr Salmeron in 6 weeks with xrays prior to visit                  Additional Services     Home Care Referral       **Order classes of: [facility], [facility] and [facility] will route to the Home Care and Hospice Referral Pool.  Home Care or Hospice will then contact the patient to schedule their appointment.**    If you do not hear from Home Care and Hospice, or you would like to call to schedule, please call the referring place of service that your provider has listed below.  ______________________________________________________________________    Your provider has referred you to: FMG: [facility] [phone]   [email or web address]  Home health physical therapy    Extended Emergency Contact Information  Primary Emergency Contact: martajeannette kruegeris   Address: [address] [place] [address] [facility]  Home Phone: [phone]  Mobile Phone: [phone]  Relation: Spouse    Patient Anticipated Discharge Date: 3/25/17   RN, PT, HHA to begin 24 - 48 hours after discharge.  PLEASE EVALUATE AND TREAT (Evaluation timeline is 24 - 48 hrs. Please call if there is need for a variance to this timeline).    REASON FOR REFERRAL: Assessment & Treatment: PT    ADDITIONAL SERVICES NEEDED: none      Current Outpatient Prescriptions:  acetaminophen (TYLENOL) 500 MG tablet, Take 2 tablets (1,000 mg) by mouth every 8 hours, Disp: 90 tablet, Rfl:  0  morphine 10 MG/5ML solution, Take 3.75-7.5 mLs (7.5-15 mg) by mouth every 3 hours as needed for moderate to severe pain, Disp: 500 mL, Rfl: 0  senna-docusate (SENOKOT-S;PERICOLACE) 8.6-50 MG per tablet, Take 3 tablets by mouth 2 times daily, Disp: 60 tablet, Rfl: 0  methocarbamol (ROBAXIN) 500 MG tablet, Take 1 tablet (500 mg) by mouth every 6 hours as needed for muscle spasms, Disp: 60 tablet, Rfl: 0  gabapentin (NEURONTIN) 600 MG tablet, Take 1 tablet (600 mg) by mouth 3 times daily 600 mg AM, 300 mg afternoon, 600 mg HS, Disp: 90 tablet, Rfl: 0  pregabalin (LYRICA) 100 MG capsule, Take 1 capsule (100 mg) by mouth 3 times daily, Disp: 90 capsule, Rfl: 0      Patient Active Problem List:     Spondylolisthesis of lumbar region     Chronic hepatitis C without hepatic coma (H)     Marijuana abuse     Anxiety     Depression     Chronic pain disorder     Chronic back pain     Major depression     Benign essential hypertension     DM type 2 (diabetes mellitus, type 2) (H)     Spinal stenosis of lumbar region with radiculopathy     S/P spinal fusion      Documentation of Face to Face and Certification for Home Health Services    I certify that patient, Jean-Claude Lowry is under my care and that I, or a Nurse Practitioner or Physician's Assistant working with me, had a face-to-face encounter that meets the physician face-to-face encounter requirements with this patient on: 3/25/17      I certify that, based on my findings, the following services are medically necessary Home Health Services: Physical Therapy    My clinical findings support the need for the above services because: Physical Therapy Services are needed to assess and treat the following functional impairments: see IP note.     Based on the above findings, I certify that this patient is confined to the home and needs intermittent skilled nursing care, physical therapy and/or speech therapy.  The patient is under my care, and I have initiated the  establishment of the plan of care.  This patient will be followed by a physician who will periodically review the plan of care.    Physician/Provider to provide follow up care: Quang Mejia        Please be aware that coverage of these services is subject to the terms and limitations of your health insurance plan.  Call member services at your health plan with any benefit or coverage questions.            Home Care Referral       **Order classes of: [facility], [facility] and [facility] will route to the Home Care and Hospice Referral Pool.  Home Care or Hospice will then contact the patient to schedule their appointment.**    If you do not hear from Home Care and Hospice, or you would like to call to schedule, please call the referring place of service that your provider has listed below.  ______________________________________________________________________    Your provider has referred you to: [facility]: [facility] [phone]   [email or web address]    Extended Emergency Contact Information  Primary Emergency Contact: letanickabhishekevansthien   Address: [address] [place] [id number] [facility]  Home Phone: [phone]  Mobile Phone: [phone]  Relation: Spouse    Patient Anticipated Discharge Date: 2/25/17   RN, PT, HHA to begin 24 - 48 hours after discharge.  PLEASE EVALUATE AND TREAT (Evaluation timeline is 24 - 48 hrs. Please call if there is need for a variance to this timeline).    REASON FOR REFERRAL: Assessment & Treatment: PT    ADDITIONAL SERVICES NEEDED: OT    Current Outpatient Prescriptions:  acetaminophen (TYLENOL) 500 MG tablet, Take 2 tablets (1,000 mg) by mouth every 8 hours, Disp: 90 tablet, Rfl: 0  morphine 10 MG/5ML solution, Take 3.75-7.5 mLs (7.5-15 mg) by mouth every 3 hours as needed for moderate to severe pain, Disp: 500 mL, Rfl: 0  senna-docusate (SENOKOT-S;PERICOLACE) 8.6-50 MG  "per tablet, Take 3 tablets by mouth 2 times daily, Disp: 60 tablet, Rfl: 0  methocarbamol (ROBAXIN) 500 MG tablet, Take 1 tablet (500 mg) by mouth every 6 hours as needed for muscle spasms, Disp: 60 tablet, Rfl: 0  gabapentin (NEURONTIN) 600 MG tablet, Take 1 tablet (600 mg) by mouth 3 times daily 600 mg AM, 300 mg afternoon, 600 mg HS, Disp: 90 tablet, Rfl: 0  pregabalin (LYRICA) 100 MG capsule, Take 1 capsule (100 mg) by mouth 3 times daily, Disp: 90 capsule, Rfl: 0      Patient Active Problem List:     Spondylolisthesis of lumbar region     Chronic hepatitis C without hepatic coma (H)     Marijuana abuse     Anxiety     Depression     Chronic pain disorder     Chronic back pain     Major depression     Benign essential hypertension     DM type 2 (diabetes mellitus, type 2) (H)     Spinal stenosis of lumbar region with radiculopathy     S/P spinal fusion      Documentation of Face to Face and Certification for Home Health Services    I certify that patient, Jean-Claude Lowry is under my care and that I, or a Nurse Practitioner or Physician's Assistant working with me, had a face-to-face encounter that meets the physician face-to-face encounter requirements with this patient on: 3/25/2017.    This encounter with the patient was in whole, or in part, for the following medical condition, which is the primary reason for Home Health Care: .    I certify that, based on my findings, the following services are medically necessary Home Health Services: Occupational Therapy and Physical Therapy    My clinical findings support the need for the above services because: Occupational Therapy Services are needed to assess and treat cognitive ability and address ADL safety due to impairments described by OT in the last note prior to discharge: \"Pt limited by pain and post surgical precautions.  SBA for bed mobility, CGA with FWW for ambulation of household distances.  Min A for toileting and mod A for clothing management.  Pending " "safe stair completion and availability of necessary assistance; Anticipate pt will be able to discharge to home with initial 24hr assist (until can demonstrate independence with toileting and clothing management) and home OT/PT.  Pt may benefit from obtaining shower chair and reacher.\" and Physical Therapy Services are needed to assess and treat the following functional impairments: spine fusion.    Based on the above findings, I certify that this patient is confined to the home and needs intermittent skilled nursing care, physical therapy and/or speech therapy.  The patient is under my care, and I have initiated the establishment of the plan of care.  This patient will be followed by a physician who will periodically review the plan of care.    Physician/Provider to provide follow up care: Quang Mejia certified Physician at time of discharge: Destinee Bartlett    Please be aware that coverage of these services is subject to the terms and limitations of your health insurance plan.  Call member services at your health plan with any benefit or coverage questions.            MED THERAPY MANAGE REFERRAL       Patient has diagnosis of Diabetes, Heart Failure, COPD, or AMI and is on more than 5 medications            Occupational Therapy Adult Consult       Evaluate and treat as clinically indicated.    Reason:  Post op deconditioning            Physical Therapy Adult Consult       PLEASE FAX AT TIME OF DISCHARGE TO:    [facility]    Ph: [phone]  Fax: [phone]    Evaluate and treat as clinically indicated.    Reason:  Post op deconditioning                  Pending Results     No orders found from 3/20/2017 to 3/23/2017.            Statement of Approval     Ordered          03/25/17 1224  I have reviewed and agree with all the recommendations and orders detailed in this document.  EFFECTIVE NOW     Approved and electronically signed by:  Destinee Bartlett MD        " "     Admission Information     Date & Time Provider Department Dept. Phone    3/22/2017 Paco Salmeron MD [address] [facility] [phone]      Your Vitals Were     Blood Pressure Pulse Temperature Respirations Height Weight    115/58 (BP Location: Right arm) 82 98.3  F (36.8  C) (Oral) 16 1.626 m (5' 4.02\") 81.3 kg (179 lb 3.7 oz)    Pulse Oximetry BMI (Body Mass Index)                92% 30.75 kg/m2          [employer] Information     [employer] lets you send messages to your doctor, view your test results, renew your prescriptions, schedule appointments and more. To sign up, go to [email or web address] . Click on \"Log in\" on the left side of the screen, which will take you to the Welcome page. Then click on \"Sign up Now\" on the right side of the page.     You will be asked to enter the access code listed below, as well as some personal information. Please follow the directions to create your username and password.     Your access code is: [email or web address]  Expires: 2017  7:30 AM     Your access code will  in 90 days. If you need help or a new code, please call your [facility] clinic or [phone].        Care EveryWhere ID     This is your Care EveryWhere ID. This could be used by other organizations to access your [facility] medical records  [id number]           Review of your medicines      START taking        Dose / Directions    acetaminophen 500 MG tablet   Commonly known as:  TYLENOL        Dose:  1000 mg   Take 2 tablets (1,000 mg) by mouth every 8 hours   Quantity:  90 tablet   Refills:  0       methocarbamol 500 MG tablet   Commonly known as:  ROBAXIN        Dose:  500 mg   Take 1 tablet (500 mg) by mouth every 6 hours as needed for muscle spasms   Quantity:  60 tablet   Refills:  0       morphine 10 MG/5ML solution        Dose:  7.5-15 mg   Take 3.75-7.5 mLs (7.5-15 mg) by mouth every 3 hours as needed for moderate to severe pain   Quantity:  500 mL   Refills:  0       senna-docusate 8.6-50 " MG per tablet   Commonly known as:  SENOKOT-S;PERICOLACE        Dose:  3 tablet   Take 3 tablets by mouth 2 times daily   Quantity:  60 tablet   Refills:  0         CONTINUE these medicines which may have CHANGED, or have new prescriptions. If we are uncertain of the size of tablets/capsules you have at home, strength may be listed as something that might have changed.        Dose / Directions    gabapentin 600 MG tablet   Commonly known as:  NEURONTIN   This may have changed:    - medication strength  - how much to take        Dose:  600 mg   Take 1 tablet (600 mg) by mouth 3 times daily 600 mg AM, 300 mg afternoon, 600 mg HS   Quantity:  90 tablet   Refills:  0         CONTINUE these medicines which have NOT CHANGED        Dose / Directions    ACIPHEX PO        Dose:  20 mg   Take 20 mg by mouth every morning   Refills:  0       CYMBALTA PO        Dose:  60 mg   Take 60 mg by mouth At Bedtime   Refills:  0       ledipasvir-sofosbuvir  MG per tablet   Commonly known as:  HARVONI   Used for:  Lumbar stenosis        Dose:  1 tablet   Take 1 tablet by mouth every morning   Refills:  0       pregabalin 100 MG capsule   Commonly known as:  LYRICA   Used for:  Spondylolisthesis of lumbar region, Spinal stenosis of lumbar region with radiculopathy        Dose:  100 mg   Take 1 capsule (100 mg) by mouth 3 times daily   Quantity:  90 capsule   Refills:  0       TRAZODONE HCL PO        Dose:  150-300 mg   Take 150-300 mg by mouth At Bedtime   Refills:  0         STOP taking     SUBOXONE 4-1 MG per film   Generic drug:  buprenorphine HCl-naloxone HCl                Where to get your medicines      These medications were sent to [facility] - [place], [place] - [address]  [address] [address]     Phone:  [phone]     acetaminophen 500 MG tablet    gabapentin 600 MG tablet    methocarbamol 500 MG tablet    senna-docusate 8.6-50 MG per tablet         Some of these will  need a paper prescription and others can be bought over the counter. Ask your nurse if you have questions.     Bring a paper prescription for each of these medications     morphine 10 MG/5ML solution    pregabalin 100 MG capsule                Protect others around you: Learn how to safely use, store and throw away your medicines at www.disposemymeds.org.             Medication List: This is a list of all your medications and when to take them. Check marks below indicate your daily home schedule. Keep this list as a reference.      Medications           Morning Afternoon Evening Bedtime As Needed    acetaminophen 500 MG tablet   Commonly known as:  TYLENOL   Take 2 tablets (1,000 mg) by mouth every 8 hours   Last time this was given:  1,000 mg on 3/25/2017  2:11 PM                                ACIPHEX PO   Take 20 mg by mouth every morning                                CYMBALTA PO   Take 60 mg by mouth At Bedtime   Last time this was given:  60 mg on 3/24/2017  9:27 PM                                gabapentin 600 MG tablet   Commonly known as:  NEURONTIN   Take 1 tablet (600 mg) by mouth 3 times daily 600 mg AM, 300 mg afternoon, 600 mg HS   Last time this was given:  600 mg on 3/25/2017  2:11 PM                                ledipasvir-sofosbuvir  MG per tablet   Commonly known as:  HARVONI   Take 1 tablet by mouth every morning   Last time this was given:  1 tablet on 3/25/2017  6:30 AM                                methocarbamol 500 MG tablet   Commonly known as:  ROBAXIN   Take 1 tablet (500 mg) by mouth every 6 hours as needed for muscle spasms   Last time this was given:  500 mg on 3/25/2017  2:15 PM                                morphine 10 MG/5ML solution   Take 3.75-7.5 mLs (7.5-15 mg) by mouth every 3 hours as needed for moderate to severe pain   Last time this was given:  15 mg on 3/25/2017 11:56 AM                                pregabalin 100 MG capsule   Commonly known as:  LYRICA   Take 1  capsule (100 mg) by mouth 3 times daily   Last time this was given:  100 mg on 3/25/2017  2:11 PM                                senna-docusate 8.6-50 MG per tablet   Commonly known as:  SENOKOT-S;PERICOLACE   Take 3 tablets by mouth 2 times daily   Last time this was given:  3 tablets on 3/25/2017  7:46 AM                                TRAZODONE HCL PO   Take 150-300 mg by mouth At Bedtime   Last time this was given:  300 mg on 3/24/2017  9:27 PM

## 2017-03-22 NOTE — PLAN OF CARE
"Problem: Goal Outcome Summary  Goal: Goal Outcome Summary  Outcome: No Change  Arrived from PACU at 1620 post L4-5 Decompression, Bilateral Transforaminal Lumbar Interbody Fusion, Pedicle Screw Instrumentation. Hypotensive (90's/50's) initially with RR 8-9. Pt c/o intolerable stabbing pain to lower back and legs but frequently drifts off to sleep. Stated \"Dilaudid doesn't do anything for me. I'm telling you I can't take that stuff\". PO Robaxin, Lyrica, and Gabapentin administered. MD's paged and pain medications changed to IV Ketamine q4hrs; currently pt states he has little change in pain level. Neuro's intact aside from numbness to L big toe. Posterior back dressing c/d/i. Tolerating Clrs. MIVF infusing at 100 mL/hr. Brunner patent with minimal output. Repo q1-2 hrs at pt request. Family at bedside and supportive in cares. Will continue to monitor and update MD's accordingly.           "

## 2017-03-22 NOTE — BRIEF OP NOTE
[facility] [place]    Brief Operative Note    Pre-operative diagnosis: Spondylolisthesis of Lumbar Region   Post-operative diagnosis Spondylolisthesis of Lumbar Region   Procedure: Procedure(s):  O-Arm/Stealth Assisted Lumbar 4-5 Decompression, Bilateral Transforaminal Lumbar Interbody Fusion, Pedicle Screw Instrumentation - Wound Class: I-Clean  Surgeon: Surgeon(s) and Role:     * Paco Salmeron MD - Primary     * Scottie Otto MD - Resident - Assisting  Anesthesia: General   Estimated blood loss: 50 ml  Drains: None  Specimens: None  Findings:   None.  Complications: None.  Implants: Medtronic Interbody Fusion Device .

## 2017-03-22 NOTE — ANESTHESIA POSTPROCEDURE EVALUATION
Patient: Jean-Claude Lowry    Procedure(s):  O-Arm/Stealth Assisted Lumbar 4-5 Decompression, Bilateral Transforaminal Lumbar Interbody Fusion, Pedicle Screw Instrumentation - Wound Class: I-Clean    Diagnosis:Spondylolisthesis of Lumbar Region   Diagnosis Additional Information: No value filed.    Anesthesia Type:  General, ETT    Note:  Anesthesia Post Evaluation    Patient location during evaluation: PACU  Patient participation: Able to fully participate in evaluation  Level of consciousness: awake  Pain management: adequate  Airway patency: patent  Cardiovascular status: acceptable  Respiratory status: acceptable  Hydration status: acceptable  PONV: none     Anesthetic complications: None          Last vitals:  Vitals:    03/22/17 1515 03/22/17 1530 03/22/17 1545   BP: 114/70 110/63 109/61   Resp: 11  14   Temp:  36.8  C (98.3  F)    SpO2: 97% 97% 99%         Electronically Signed By: Chetan Peterson MD  March 22, 2017  3:49 PM

## 2017-03-22 NOTE — IP AVS SNAPSHOT
[address] [address]    Phone:  [phone]                                       After Visit Summary   3/22/2017    Jean-Claude Lowry    MRN: [id number]           After Visit Summary Signature Page     I have received my discharge instructions, and my questions have been answered. I have discussed any challenges I see with this plan with the nurse or doctor.    ..........................................................................................................................................  Patient/Patient Representative Signature      ..........................................................................................................................................  Patient Representative Print Name and Relationship to Patient    ..................................................               ................................................  Date                                            Time    ..........................................................................................................................................  Reviewed by Signature/Title    ...................................................              ..............................................  Date                                                            Time

## 2017-03-22 NOTE — ANESTHESIA CARE TRANSFER NOTE
Patient: Jean-Claude Lowry    Procedure(s):  O-Arm/Stealth Assisted Lumbar 4-5 Decompression, Bilateral Transforaminal Lumbar Interbody Fusion, Pedicle Screw Instrumentation - Wound Class: I-Clean    Diagnosis: Spondylolisthesis of Lumbar Region   Diagnosis Additional Information: No value filed.    Anesthesia Type:   General, ETT     Note:  Airway :Face Mask  Patient transferred to:PACU  Comments: Prior to extubation, patient breathing spontaneously and respiratory rate and tidal volume were appropriate. The patient was following commands. The patient was warm and demonstrated adequate strength. Patient was suctioned and extubated without complication.   Transported to PACU with precedex @ 0.2mcg/kg/hr  VSS upon arrival   Care transferred to PACU RN      Vitals: (Last set prior to Anesthesia Care Transfer)    CRNA VITALS  3/22/2017 1353 - 3/22/2017 1432      3/22/2017             Pulse: 77    Ht Rate: 76    SpO2: 99 %    Resp Rate (observed): 9    Resp Rate (set): 10                Electronically Signed By: Dylon Valadez MD  March 22, 2017  2:32 PM

## 2017-03-22 NOTE — LETTER
Transition Communication Hand-off for Care Transitions to Next Level of Care Provider    Name: Jean-Claude Lowry  MRN #: [id number]  Primary Care Provider: Quang Mejia     Primary Clinic: [address] [address]     Reason for Hospitalization:  Spondylolisthesis of Lumbar Region   S/P spinal fusion  Admit Date/Time: 3/22/2017  6:55 AM  Discharge Date: 3-25-17    Reason for Communication Hand-off Referral:   Continuity of Care after Hospitalization    Discharge Plan:  Discharge to home with [facility] for RN and PT services     Discharge Needs Assessment:  Needs       Most Recent Value    Equipment Currently Used at Home cane, straight    Transportation Available car, family or friend will provide    Home Care [facility] [phone], Fax: [phone]         Follow-up plan:  Future Appointments  Date Time Provider Department Center   3/26/2017 6:00 AM Lilian Kelley, PT [facility]   3/26/2017 6:00 AM Swathi Zeng OT [facility]       Any outstanding tests or procedures:        Referrals     Future Labs/Procedures    Home Care Referral     Comments:    PLEASE FAX AT TIME OF DISCHARGE TO:    [facility]    Ph: [phone]  Fax: [phone]    RN to assess for pain and medication management, hydration, nutrition and bowel status, activity tolerance and endurance, home safety evaluation, monitor incision for signs of infection and general status evaluation after discharge post spinal fusion and after acute care hospitalization.      If you do not hear from Home Care and Hospice, or you would like to call to schedule, please call the referring place of service that your provider has listed below.  ______________________________________________________________________    Your provider has referred you to: [facility]: [facility] [place] [phone]   [email or web address]  Home health  physical therapy    Extended Emergency Contact Information  Primary Emergency Contact: thien sweeney   Address: [address] [address] [facility]  Home Phone: [phone]  Mobile Phone: [phone]  Relation: Spouse    Patient Anticipated Discharge Date: 3/25/17   RN, PT, HHA to begin 24 - 48 hours after discharge.  PLEASE EVALUATE AND TREAT (Evaluation timeline is 24 - 48 hrs. Please call if there is need for a variance to this timeline).    REASON FOR REFERRAL: Assessment & Treatment: PT    ADDITIONAL SERVICES NEEDED: none      Current Outpatient Prescriptions:  acetaminophen (TYLENOL) 500 MG tablet, Take 2 tablets (1,000 mg) by mouth every 8 hours, Disp: 90 tablet, Rfl: 0  morphine 10 MG/5ML solution, Take 3.75-7.5 mLs (7.5-15 mg) by mouth every 3 hours as needed for moderate to severe pain, Disp: 500 mL, Rfl: 0  senna-docusate (SENOKOT-S;PERICOLACE) 8.6-50 MG per tablet, Take 3 tablets by mouth 2 times daily, Disp: 60 tablet, Rfl: 0  methocarbamol (ROBAXIN) 500 MG tablet, Take 1 tablet (500 mg) by mouth every 6 hours as needed for muscle spasms, Disp: 60 tablet, Rfl: 0  gabapentin (NEURONTIN) 600 MG tablet, Take 1 tablet (600 mg) by mouth 3 times daily 600 mg AM, 300 mg afternoon, 600 mg HS, Disp: 90 tablet, Rfl: 0  pregabalin (LYRICA) 100 MG capsule, Take 1 capsule (100 mg) by mouth 3 times daily, Disp: 90 capsule, Rfl: 0      Patient Active Problem List:     Spondylolisthesis of lumbar region     Chronic hepatitis C without hepatic coma (H)     Marijuana abuse     Anxiety     Depression     Chronic pain disorder     Chronic back pain     Major depression     Benign essential hypertension     DM type 2 (diabetes mellitus, type 2) (H)     Spinal stenosis of lumbar region with radiculopathy     S/P spinal fusion      Documentation of Face to Face and Certification for Home Health Services    I certify that patient, Jean-Claude Sweeney is under my care  and that I, or a Nurse Practitioner or Physician's Assistant working with me, had a face-to-face encounter that meets the physician face-to-face encounter requirements with this patient on: 3/25/17      I certify that, based on my findings, the following services are medically necessary Home Health Services: Physical Therapy    My clinical findings support the need for the above services because: Physical Therapy Services are needed to assess and treat the following functional impairments: see IP note.     Based on the above findings, I certify that this patient is confined to the home and needs intermittent skilled nursing care, physical therapy and/or speech therapy.  The patient is under my care, and I have initiated the establishment of the plan of care.  This patient will be followed by a physician who will periodically review the plan of care.    Physician/Provider to provide follow up care: Quang Mejia        Please be aware that coverage of these services is subject to the terms and limitations of your health insurance plan.  Call member services at your health plan with any benefit or coverage questions.    MED THERAPY MANAGE REFERRAL     Comments:    Patient has diagnosis of Diabetes, Heart Failure, COPD, or AMI and is on more than 5 medications    Physical Therapy Adult Consult     Comments:    PLEASE FAX AT TIME OF DISCHARGE TO:    [facility]    Ph: [phone]  Fax: [phone]    Evaluate and treat as clinically indicated.    Reason:  Post op deconditioning            Kelli Cai RN Weekend Care Coordinator    AVS/Discharge Summary is the source of truth; this is a helpful guide for improved communication of patient story

## 2017-03-23 ENCOUNTER — APPOINTMENT (OUTPATIENT)
Dept: PHYSICAL THERAPY | Facility: CLINIC | Age: 62
DRG: 460 | End: 2017-03-23
Attending: STUDENT IN AN ORGANIZED HEALTH CARE EDUCATION/TRAINING PROGRAM
Payer: MEDICARE

## 2017-03-23 LAB
ANION GAP SERPL CALCULATED.3IONS-SCNC: 5 MMOL/L (ref 3–14)
BUN SERPL-MCNC: 13 MG/DL (ref 7–30)
CALCIUM SERPL-MCNC: 8.3 MG/DL (ref 8.5–10.1)
CHLORIDE SERPL-SCNC: 103 MMOL/L (ref 94–109)
CO2 SERPL-SCNC: 28 MMOL/L (ref 20–32)
CREAT SERPL-MCNC: 0.89 MG/DL (ref 0.66–1.25)
ERYTHROCYTE [DISTWIDTH] IN BLOOD BY AUTOMATED COUNT: 13 % (ref 10–15)
GFR SERPL CREATININE-BSD FRML MDRD: 87 ML/MIN/1.7M2
GLUCOSE BLDC GLUCOMTR-MCNC: 147 MG/DL (ref 70–99)
GLUCOSE SERPL-MCNC: 137 MG/DL (ref 70–99)
HCT VFR BLD AUTO: 38.4 % (ref 40–53)
HGB BLD-MCNC: 12.6 G/DL (ref 13.3–17.7)
MAGNESIUM SERPL-MCNC: 1.6 MG/DL (ref 1.6–2.3)
MCH RBC QN AUTO: 29.4 PG (ref 26.5–33)
MCHC RBC AUTO-ENTMCNC: 32.8 G/DL (ref 31.5–36.5)
MCV RBC AUTO: 90 FL (ref 78–100)
PLATELET # BLD AUTO: 139 10E9/L (ref 150–450)
POTASSIUM SERPL-SCNC: 4.1 MMOL/L (ref 3.4–5.3)
RBC # BLD AUTO: 4.29 10E12/L (ref 4.4–5.9)
SODIUM SERPL-SCNC: 136 MMOL/L (ref 133–144)
WBC # BLD AUTO: 9.4 10E9/L (ref 4–11)

## 2017-03-23 PROCEDURE — 00000146 ZZHCL STATISTIC GLUCOSE BY METER IP

## 2017-03-23 PROCEDURE — 12000008 ZZH R&B INTERMEDIATE UMMC

## 2017-03-23 PROCEDURE — 99222 1ST HOSP IP/OBS MODERATE 55: CPT | Performed by: NURSE PRACTITIONER

## 2017-03-23 PROCEDURE — 80048 BASIC METABOLIC PNL TOTAL CA: CPT | Performed by: NEUROLOGICAL SURGERY

## 2017-03-23 PROCEDURE — 25000132 ZZH RX MED GY IP 250 OP 250 PS 637: Mod: GY | Performed by: STUDENT IN AN ORGANIZED HEALTH CARE EDUCATION/TRAINING PROGRAM

## 2017-03-23 PROCEDURE — 40000193 ZZH STATISTIC PT WARD VISIT

## 2017-03-23 PROCEDURE — 85027 COMPLETE CBC AUTOMATED: CPT | Performed by: NEUROLOGICAL SURGERY

## 2017-03-23 PROCEDURE — A9270 NON-COVERED ITEM OR SERVICE: HCPCS | Mod: GY | Performed by: NEUROLOGICAL SURGERY

## 2017-03-23 PROCEDURE — 36415 COLL VENOUS BLD VENIPUNCTURE: CPT | Performed by: NEUROLOGICAL SURGERY

## 2017-03-23 PROCEDURE — 97162 PT EVAL MOD COMPLEX 30 MIN: CPT | Mod: GP

## 2017-03-23 PROCEDURE — 25000132 ZZH RX MED GY IP 250 OP 250 PS 637: Mod: GY | Performed by: NEUROLOGICAL SURGERY

## 2017-03-23 PROCEDURE — 97530 THERAPEUTIC ACTIVITIES: CPT | Mod: GP

## 2017-03-23 PROCEDURE — 25000125 ZZHC RX 250: Performed by: STUDENT IN AN ORGANIZED HEALTH CARE EDUCATION/TRAINING PROGRAM

## 2017-03-23 PROCEDURE — A9270 NON-COVERED ITEM OR SERVICE: HCPCS | Mod: GY | Performed by: STUDENT IN AN ORGANIZED HEALTH CARE EDUCATION/TRAINING PROGRAM

## 2017-03-23 PROCEDURE — 83735 ASSAY OF MAGNESIUM: CPT | Performed by: NEUROLOGICAL SURGERY

## 2017-03-23 PROCEDURE — 25000128 H RX IP 250 OP 636: Performed by: STUDENT IN AN ORGANIZED HEALTH CARE EDUCATION/TRAINING PROGRAM

## 2017-03-23 RX ORDER — MORPHINE SULFATE 10 MG/5ML
7.5-15 SOLUTION ORAL
Status: DISCONTINUED | OUTPATIENT
Start: 2017-03-23 | End: 2017-03-25 | Stop reason: HOSPADM

## 2017-03-23 RX ORDER — MORPHINE SULFATE 2 MG/ML
1-2 INJECTION, SOLUTION INTRAMUSCULAR; INTRAVENOUS
Status: DISCONTINUED | OUTPATIENT
Start: 2017-03-23 | End: 2017-03-24

## 2017-03-23 RX ORDER — LIDOCAINE 50 MG/G
3 PATCH TOPICAL
Status: DISCONTINUED | OUTPATIENT
Start: 2017-03-23 | End: 2017-03-25 | Stop reason: HOSPADM

## 2017-03-23 RX ORDER — GABAPENTIN 600 MG/1
600 TABLET ORAL EVERY 8 HOURS
Status: DISCONTINUED | OUTPATIENT
Start: 2017-03-23 | End: 2017-03-25 | Stop reason: HOSPADM

## 2017-03-23 RX ORDER — METHOCARBAMOL 500 MG/1
500 TABLET, FILM COATED ORAL EVERY 6 HOURS PRN
Status: DISCONTINUED | OUTPATIENT
Start: 2017-03-23 | End: 2017-03-25 | Stop reason: HOSPADM

## 2017-03-23 RX ORDER — PREGABALIN 100 MG/1
100 CAPSULE ORAL EVERY 8 HOURS
Status: DISCONTINUED | OUTPATIENT
Start: 2017-03-23 | End: 2017-03-25 | Stop reason: HOSPADM

## 2017-03-23 RX ADMIN — MORPHINE SULFATE 2 MG: 2 INJECTION, SOLUTION INTRAMUSCULAR; INTRAVENOUS at 19:59

## 2017-03-23 RX ADMIN — GABAPENTIN 600 MG: 300 CAPSULE ORAL at 14:11

## 2017-03-23 RX ADMIN — Medication 5 MG: at 10:14

## 2017-03-23 RX ADMIN — GABAPENTIN 600 MG: 600 TABLET, FILM COATED ORAL at 20:56

## 2017-03-23 RX ADMIN — MORPHINE SULFATE 15 MG: 10 SOLUTION ORAL at 23:17

## 2017-03-23 RX ADMIN — ACETAMINOPHEN 1000 MG: 500 TABLET, FILM COATED ORAL at 06:02

## 2017-03-23 RX ADMIN — Medication 5 MG: at 12:29

## 2017-03-23 RX ADMIN — TRAZODONE HYDROCHLORIDE 150 MG: 150 TABLET ORAL at 20:55

## 2017-03-23 RX ADMIN — PREGABALIN 100 MG: 100 CAPSULE ORAL at 14:11

## 2017-03-23 RX ADMIN — TRAZODONE HYDROCHLORIDE 150 MG: 150 TABLET ORAL at 20:57

## 2017-03-23 RX ADMIN — LIDOCAINE 2 PATCH: 50 PATCH TOPICAL at 10:16

## 2017-03-23 RX ADMIN — PANTOPRAZOLE SODIUM 40 MG: 40 TABLET, DELAYED RELEASE ORAL at 06:24

## 2017-03-23 RX ADMIN — Medication 5 MG: at 06:02

## 2017-03-23 RX ADMIN — ACETAMINOPHEN 1000 MG: 500 TABLET, FILM COATED ORAL at 14:12

## 2017-03-23 RX ADMIN — MORPHINE SULFATE 15 MG: 10 SOLUTION ORAL at 17:01

## 2017-03-23 RX ADMIN — MORPHINE SULFATE 15 MG: 10 SOLUTION ORAL at 19:29

## 2017-03-23 RX ADMIN — GABAPENTIN 600 MG: 300 CAPSULE ORAL at 06:23

## 2017-03-23 RX ADMIN — SENNOSIDES AND DOCUSATE SODIUM 3 TABLET: 8.6; 5 TABLET ORAL at 19:59

## 2017-03-23 RX ADMIN — ACETAMINOPHEN 1000 MG: 500 TABLET, FILM COATED ORAL at 20:54

## 2017-03-23 RX ADMIN — LEDIPASVIR AND SOFOSBUVIR 1 TABLET: 90; 400 TABLET, FILM COATED ORAL at 05:49

## 2017-03-23 RX ADMIN — PREGABALIN 100 MG: 100 CAPSULE ORAL at 06:23

## 2017-03-23 RX ADMIN — PREGABALIN 100 MG: 100 CAPSULE ORAL at 20:55

## 2017-03-23 RX ADMIN — DOCUSATE SODIUM 100 MG: 100 CAPSULE, LIQUID FILLED ORAL at 08:54

## 2017-03-23 RX ADMIN — SENNOSIDES AND DOCUSATE SODIUM 3 TABLET: 8.6; 5 TABLET ORAL at 08:53

## 2017-03-23 RX ADMIN — Medication 5 MG: at 14:12

## 2017-03-23 RX ADMIN — DULOXETINE 60 MG: 60 CAPSULE, DELAYED RELEASE ORAL at 20:55

## 2017-03-23 RX ADMIN — DOCUSATE SODIUM 100 MG: 100 CAPSULE, LIQUID FILLED ORAL at 20:00

## 2017-03-23 ASSESSMENT — VISUAL ACUITY
OU: NORMAL ACUITY

## 2017-03-23 NOTE — PLAN OF CARE
Problem: Goal Outcome Summary  Goal: Goal Outcome Summary  Outcome: No Change  Pt is POD #1 s/p L4-5 Decompression, Bilateral Transforaminal Lumbar Interbody Fusion, Pedicle Screw Instrumentation. Pt lethargic overnight, arouses easily to voice. Oriented x4. On 4L via NC. Tmax 100.1. Capnography monitored closely overnight. IPI 4-10 and EtCO2 37-42. Interventions completed; waking pt up and having him take deep breaths, pain medications delayed/withheld. Charge RN notified of capnography values. EDD De Los Santos notified. Neuros unchanged; generalized weakness and numbness to L big toe at baseline. Pt continues to c/o intolerable pain to lower back and legs. Ketamine gtt continues at 2.5 ml/hr. RN delayed Valium dose d/t capnography readings. Pt states that the ketamine has not helped at all, pain has stayed the same. EDD De Los Santos aware and states pt will be seen by pain team today. Posterior back dressing CDI. Tolerated clears. Advanced to Regular diet this AM. PIVF infusing at 100 mL/hr. Brunner pulled this AM at 0610. Repo q1-2 hrs at pt request. Continue to monitor and follow POC.

## 2017-03-23 NOTE — PROGRESS NOTES
Care Coordinator Progress Note     Admission Date/Time:  3/22/2017  Attending MD:  Dr Paco Salmeron     Data  Chart reviewed, discussed with interdisciplinary team. Per 6A Discharge Rounds report pt is on a Ketamine drip; on oxygen; PT & OT consulted; Pain Service consulted.     Today OT was not able to work with pt due to pain. PT recommending TCU.      Patient was admitted for:  L4-L5 lumbar spondylolisthesis with stenosis & radiculopathy.   Procedure 3-22-17:  Bilateral L4-L5 transforaminal lumbar interbody fusion; placement of L4-L5 pedicle screw fixation.     Past history includes: Hepatitis C; sleep apnea; GERD; chronic pain & depression; diabetes.   Per 3-15-17 outpt H&P:  pt uses marijuana daily. He was recently diagnosed with sleep apnea and just fitted with CPAP. Recently started on Harvoni for Hep C.     Pt is [facility] and was on Suboxone (held for surgery). Pt was evaluated by Pharmacy prior to admission, see note from 3-15-17 by Henrik Javier, Pharmacist.     Concerns with insurance coverage for discharge needs: None.  Current Living Situation: Patient lives with spouse.  Support System: TBD  Services Involved: TBD  Transportation: TBD  Barriers to Discharge: Chronic pain    Coordination of Care and Referrals  Chart reviewed; discussed in Interdisciplinary Rounds.         Assessment  Pt recovering after surgery for lumbar fusion. Needing pain management.      Plan  Anticipated Discharge Date:  TBD, when medically stable and pain controlled.   Anticipated Discharge Plan:  TCU? Pending progress with therapy.    --At discharge will need plan to follow-up with outpt pain providers.       Becky Rivera, RN Care Coordinator  Unit 6A, [facility]

## 2017-03-23 NOTE — PLAN OF CARE
Problem: Goal Outcome Summary  Goal: Goal Outcome Summary  PT 6A:  Eval completed and treatment initiated.  Pain very limiting to activity tolerance.  Also presenting with posterior RLE sensory impairments, functional LE weakness, and cardiopulm deconditioning.  Pt requiring mod A for all bed mobility and transfers.  Ambulated at bedside with FWW and min A, pain increasing significantly.     Anticipate TCU at this time.  Pt likely to have slow mobility progression 2/2 pain. Would need to climb 17steps to enter his home

## 2017-03-23 NOTE — OP NOTE
DATE OF PROCEDURE:  03/22/2017      PREOPERATIVE DIAGNOSIS:  L4-L5 lumbar spondylolisthesis with stenosis and radiculopathy.      POSTOPERATIVE DIAGNOSIS:  L4-L5 lumbar spondylolisthesis with stenosis and radiculopathy.      PROCEDURES PERFORMED:   1.  Bilateral L4-5 decompression  2.  Bilateral L4-L5 transforaminal lumbar interbody fusion.   3.  Placement of L4-L5 pedicle screw fixation.   4.  Use of intraoperative neuronavigation.   5.  Use of intraoperative O-arm.    6.  Use of intraoperative microscopy.      SURGEON:  Paco Salmeron MD      ASSISTANT:  Scottie Otto MD      INDICATIONS FOR PROCEDURE:  Mr. Jean-Claude Lowry is a 62-year-old gentleman who presented to the Neurosurgery Clinic at the [facility] with concerns of low back pain for about 1-1/2 years.  It is designated in his lower back and radiated across his left buttock in L4 distribution.  He had a prior history of bilateral L4-L5 laminectomy many years ago and subsequent L5-S1 fusion.  Due to spondylolisthesis with stenosis and imaging findings, he was consented for decompression as well as fusion surgery, as mentioned above, after risks, benefits and alternatives for the procedure were explained to him in detail.      PROCEDURE:  Mr. Lowry was brought into the operating room by Anesthesia colleagues.  He underwent general endotracheal anesthesia and a Brunner catheter was placed.  He was then positioned prone on the Bin table and all the pressure points were appropriately padded.  The midline of his lumbar spine was marked and 2 incisions were marked about 1.8 cm off midline in the lumbar region.  Sterile prepping and draping was completed and an intraoperative O-arm, which was sterilely draped, was brought in the surgical field and x-rays taken to plan the surgical level.  Local anesthetic was injected and timeout was completed.      A #15 blade was used to open up a 2.5 cm incision on the left side first.   Dissection was performed with monopolar cautery to expose the fascia and then serial [employer] dilator tubes were used to dock the retractor and connect it to at the bed at L4-L5 region on the left side.  Then the intraoperative microscope which was sterilely draped was brought into the surgical field and a hemilaminectomy of L4-L5 on the left side was performed by drilling. The left side L4-5 facet was removed partially removed with an osteotome and remaining by drilling.  Significant scar tissue was noted which along with the ligamentum flavum was removed with a combination of Kerrison 3 and 4 punches and upgoing curet. The spinal canal was well decompressed. Then the diskectomy was performed at L4 and L5 and a #10 trial implant dilator was left in place.  During the dissection, it was noted there was a small stitch near on the left side of the dura, which was left in place.  This was thought to be from previous dural tear during his previous laminectomy procedure.      Then a new incision was made on the right side at the same level and dissection was performed with monopolar cautery and serial dilators used once again, along with retractor which is docked at L4-L5 disk space on the right side.  Similarly hemilaminectomy was performed at right-sided L4-L5, along with facetectomy and the spinal canal was well decompressed.  The disk space was exposed and thecal sac was moved medially and diskectomy was performed from the right side.  Significant disc was removed from the right side and serial dilators for the implant were placed and assessed.  Then 12 x 22 x 12 degree Capstone controlled implants were placed, which were filled with auto and allograft bone into the disk space.  The remaining disk space posteriorly was packed with allograft and autograft bone.    Then the retractor was removed, a Stealth percutaneous pin was placed in the right side posterior superior iliac spine and the O-arm was then used to  perform CT scan. The implants looked well placed anteriorly. Then under Stealth guidance, pedicle screws were placed at L4-L5 using minimally invasive technique with K-wires and appropriate tapping.  The screws measured 6.5 x 50 on the left at L4 and 7.5 x 50 on the right at L4.  At the left side of L5 was 7.5 x 45, on the right side was 7.5 x 50.  Once these were placed, x-rays were taken to confirm good placement of the screws. Then cobalt chrome rods measuring 4.75 x 30 mm on the left and 4.75 x 35 mm on the right were placed and mild compression was performed to achieve further lumbar lordosis and x-ray was taken to confirm the same.     Then, irrigation was performed, the fascia was closed with 0 Vicryl stitches in an interrupted watertight manner.  The subcutaneous tissue was closed with 2-0 Vicryl in an inverted interrupted manner.  The skin, itself, was closed with 4-0 Monocryl running subcuticular manner.  Wet and dry dressings were applied and ChloraPrep was then used to clean the incision site. The needle and sponge count was correct at the end of procedure.      Dr. Olamide Walsh was present and scrubbed in for most of the procedure including all critical portions and immediately available for the rest of it.          OLAMIDE WALSH MD       As dictated by YINKA SANTAMARIA MD            D: 2017 19:36   T: 2017 20:21   MT:       Name:     NELLY HOLLAND   MRN:      [id number]        Account:        [id number]   :      1955           Procedure Date: 2017      Document: [id number]

## 2017-03-23 NOTE — CONSULTS
Inpatient Pain Management Service: Consultation      DATE OF CONSULT: March 23, 2017      REASON FOR PAIN CONSULTATION:  Jean-Claude Lowry is a 62 year old male I am seeing in consultation at the request of Dr. Rena Bartlett MD for evaluation and recommendations for his acute post-operative pain.       CHIEF PAIN COMPLAINT: back pain      ASSESSMENT:   1. Acute post-operative pain status post L4-5 decompression, bilateral transformational interbody fusion with pedicle screw instrumentation on 03/22/17.  2. Sedation vs pain report. Patient sleeping comfortably upon entering his room. Patient arouses to verbal stimuli. RN reports indicate patient sedated over night with reports on intolerable pain whenever arouses.   3. Chronic back pain, with spondylolisthesis  4. History of  HTN  5. History of Diabetes  6. Depression  7. BMI of 30 with ANNE MARIE in setting of opioids, increased risk of respiratory depression  8. History of polysubstance use disorder: Oxycontin, Marijuana and illicit drugs, patient reports he uses marijuana daily about 5 times per day, and last used on the morning of surgery. He also reports using any illicit drug available to him during the 70s and 80s. He reports his last use of illicit drugs was over 20 years ago. However, patient has a positive Urine Drug Screen from 05/14/15 that was positive for amphetamines with cannabis.  9. Patient is on suboxone for maintenance treatment, managed by Dr. Koki Alcantar.    Outpatient medications related to pain prior to admission:   --buprenorphine-naloxone 4-1mg SL film, BID  --gabapentin 600mg, 300mg, 600mg, PO Q 8 hours  --pregabalin 100mg PO BID    Outpatient buprenorphine-naloxone prescribed by: Koki Alcantar MD (addictionologist)  PRIMARY CARE PROVIDER: Quang Mejia  PAIN SPECIALIST: none    [place]  database reviewed: reviewed, consistent prescribing of Suboxone, gabapentin and pregabalin.       TREATMENT RECOMMENDATIONS/PLAN:   1. Continue ketamine  infusion at 5mg/hr  2. Discontinue IV PRN ketamine nurse administered doses.  3. Discontinue buprenorphine-naloxone, (patient reports his last dose of Suboxone was on 03/20/17, PM dose)  4. Start morphine liquid 7.5-15mg PO Q 3 hours PRN (patient reports HYDROmorphone is not effective for his pain with previous trials).  5. Start morphine 1-2 mg IV Q 2 hours PRN for rescue only, Use orals first.   6. Discontinue diazepam  7. Start methocarbamol 500mg PO Q 6 hours PRN for muscle spasms, methocarbamol is a less sedating muscle relaxant than diazepam.   8. Change gabapentin ot 600mg PO Q 8 hours scheduled  9. Change pregabalin 100mg PO Q 8 hours  10. Continue lidocaine 5% patches, 1-3 patches TD Q 12 hours on and 12 hours off  11. Continue duloxetine 60 mg PO Q HS  12. Continue senna-docusate 8.6-50mg, 3 tablets   13. Bowel regimen per primary team for opioid induced constipatin  14. Continue trazodone 300mg PO Q HS  15. Get a UDS for any altered mental status.   16. Patient to be on capnography at all times.   17. Consider respiratory therapy consult for CPAP while inpatient.         ASSESSMENT AND RECOMMENDATIONS DISCUSSED WITH: Dr. Destinee Bartlett MD plan discussed with Dr. Chay Kurtz  from the pain service.       Thank you for consulting the Inpatient Pain Management Service.   The above recommendations are to be acted upon at the primary team s discretion.    To reach us:  Mon - Friday 8 AM - 3 PM: Pager [phone]   After hours, weekends and holidays: Primary service should call [phone] for the on-call pain specialist    HISTORY OF PRESENT ILLNESS: Jean-Claude Lowry is a 62 year old male with history of HTN, diabetes, depression, spondylolisthesis, ANNE MARIE, history of polysubstance use disorder including opioids and illicit drugs. Patient underwent L4-L5 decompression and bilateral tranforaminal interbody fusion with pedicle screw instrumentation on 03/22/17.    Today patient is sleeping in bed, arouses to  verbal stimuli, states his pain is located in his lumbar back. He reports the pain is intolerable, describes the pain as sharp, constant, worse with movement, better with rest. He reports HYDROmorphone does not help his pain with previous trials, but that morphine does help.  He reports having muscle spasms. He denies any bilateral lower extremity weakness or numbness. He admits to baseline left lateral thigh pain that he reports is per his baseline prior to surgery. He reports his last bowel movement was on 03/21/17. He denies passing flatus. He reports a history of ETOH use 2 years ago, and reports at that time he was drinking 4-6 beers a few nights per week. He denies any current use. He reports History of polysubstance use: Oxycontin, Marijuana and illicit drugs, patient reports he uses marijuana daily about 5 times per day, and last used on the morning of surgery. He also reports using any illicit drug available to him during the 70s and 80s. He reports his last use of illicit drugs was over 20 years ago. However, patient has a positive Urine Drug Screen from 05/14/15 that was positive for amphetamines with cannabis.      PAIN HISTORY:   Location: lumbar back  Duration: constant  Quality of the pain: sharp  Aggravating factors: activity  Relieving factors : rest    CAPA (Clinically Aligned Pain Assessment)  Comfort (How is your pain?): Intolerable  Change in Pain (Since your last medication/intervention?): About the same  Pain Control (How are your pain treatments working?): Inadequate pain control  Functioning (Are you able to do activities to get better?) : Pain keeps me from doing most of what I need to do  Sleep (Does your pain management allow you to sleep or rest?): Awake with pain most of the night       FUNCTIONAL STATUS:  Change:      staying the same  Oral intake:     Regular  Bowel function:    No bowel movements since 03/20/17  Activity level:     Up with assistance ambulating in  room  Sleep:      Reports poor sleep due to pain last night  Mood:      tired, poor energy      REVIEW OF 10 BODY SYSTEMS: 10 point ROS of systems including Constitutional, Eyes, Respiratory, Cardiovascular, Gastroenterology, Genitourinary, Integumentary, Musculoskeletal, Psychiatric were all negative except for pertinent positives noted in my HPI.       INPATIENT MEDICATIONS PERTINENT TO PAIN CONSULT:   --ketamine infusion running at  5mg/hr  --ketamine 5mg IV Q 4 hours PRN (0mg used from 7AM-7AM)  --buprenorphine-naloxone 4-1mg SL film BID (0mg used from 7AM-7AM)  --acetaminophen 1,000mg PO Q 8 hours  --gabapentin 600mg PO TID  --pregabalin 100mg PO TID  --lidocaine 5% patches, 1-3 patches, TD Q 24 hours  --diazepam 2.5-5mg PO Q 6 hours PRN  --senna-docusate 8.6-50mg, 3 tablets PO BID  --trazodone 150-300mg PO Q HS    PAST PAIN TREATMENTS:   --HYDROmorphone- no help      CURRENT MEDICATIONS:   Current Facility-Administered Medications Ordered in Epic   Medication Dose Route Frequency Provider Last Rate Last Dose     lidocaine (LIDODERM) 5 % Patch 3 patch  3 patch Transdermal Q24H Alden De Los Santos MD         lidocaine (LIDODERM) patch REMOVAL   Transdermal Q24h Alden De Lo sSantos MD         lidocaine (LIDODERM) Patch in Place   Transdermal Q8H Alden De Los Santos MD         buprenorphine HCl-naloxone HCl (SUBOXONE) 4-1 MG per film 1 Film  1 Film Sublingual BID Destinee Bartlett MD         DULoxetine (CYMBALTA) EC capsule 60 mg  60 mg Oral At Bedtime Destinee Bartlett MD   60 mg at 03/22/17 2246     ledipasvir-sofosbuvir (HARVONI)  MG per tablet TABS 1 tablet  1 tablet Oral Destinee Flynn MD   1 tablet at 03/23/17 0549     pantoprazole (PROTONIX) EC tablet 40 mg  40 mg Oral Destinee Flynn MD   40 mg at 03/23/17 0624     traZODone (DESYREL) tablet 150-300 mg  150-300 mg Oral At Bedtime Destinee Bartlett MD   300 mg at 03/22/17 2246     lidocaine 1 % 1 mL  1 mL Other Q1H PRN Destinee Bartlett MD          lidocaine (LMX4) kit   Topical Q1H PRN Destinee Bartlett MD         sodium chloride (PF) 0.9% PF flush 3 mL  3 mL Intracatheter Q1H PRN Destinee Bartlett MD         sodium chloride (PF) 0.9% PF flush 3 mL  3 mL Intracatheter Q8H Destinee Bartlett MD         naloxone (NARCAN) injection 0.1-0.4 mg  0.1-0.4 mg Intravenous Q2 Min PRN Destinee Bartlett MD         0.9% sodium chloride infusion   Intravenous Continuous Destinee Bartlett  mL/hr at 03/22/17 1529       ondansetron (ZOFRAN-ODT) ODT tab 4 mg  4 mg Oral Q6H PRN Destinee Bartlett MD        Or     ondansetron (ZOFRAN) injection 4 mg  4 mg Intravenous Q6H PRN Destinee Bartlett MD   4 mg at 03/22/17 1643     prochlorperazine (COMPAZINE) injection 5-10 mg  5-10 mg Intravenous Q6H PRN Destinee Bartlett MD        Or     prochlorperazine (COMPAZINE) tablet 5-10 mg  5-10 mg Oral Q6H PRN Destinee Bartlett MD         docusate sodium (COLACE) capsule 100 mg  100 mg Oral BID Destinee Bartlett MD   100 mg at 03/22/17 2111     acetaminophen (TYLENOL) tablet 1,000 mg  1,000 mg Oral Q8H Destinee Bartlett MD   1,000 mg at 03/23/17 0602     pregabalin (LYRICA) capsule 100 mg  100 mg Oral TID Destinee Bartlett MD   100 mg at 03/23/17 0623     gabapentin (NEURONTIN) capsule 600 mg  600 mg Oral TID Destinee Bartlett MD   600 mg at 03/23/17 0623     ketamine (KETALAR) injection 5 mg  5 mg Intravenous Q4H PRN Destinee Bartlett MD         ketamine (KETALAR) 2 mg/mL in NaCl 0.9 % 100 mL infusion  5 mg/hr Intravenous Continuous Destinee Bartlett MD 2.5 mL/hr at 03/23/17 0041 5 mg/hr at 03/23/17 0041     senna-docusate (SENOKOT-S;PERICOLACE) 8.6-50 MG per tablet 3 tablet  3 tablet Oral BID Alden De Los Santos MD         diazepam (VALIUM) half-tab 2.5-5 mg  2.5-5 mg Oral Q6H PRN Alden De Los Santos MD   5 mg at 03/23/17 0602     No current [facility]-ordered outpatient prescriptions on file.           HOME/PREVIOUS MEDICATIONS:   Prior to Admission medications    Medication Sig Start Date  End Date Taking? Authorizing Provider   ledipasvir-sofosbuvir (HARVONI)  MG per tablet Take 1 tablet by mouth every morning 2/3/17  Yes Reported, Patient   buprenorphine HCl-naloxone HCl (SUBOXONE) 4-1 MG per film Place 1 each under the tongue 2 times daily  1/30/17  Yes Reported, Patient   pregabalin (LYRICA) 100 MG capsule Take 100 mg by mouth 3 times daily  1/13/17  Yes Reported, Patient   RABEprazole Sodium (ACIPHEX PO) Take 20 mg by mouth every morning    Yes Reported, Patient   DULoxetine HCl (CYMBALTA PO) Take 60 mg by mouth At Bedtime    Yes Reported, Patient   TRAZODONE HCL PO Take 150-300 mg by mouth At Bedtime   Yes Reported, Patient   GABAPENTIN PO Take by mouth 3 times daily 600 mg AM, 300 mg afternoon, 600 mg HS   Yes Reported, Patient         ALLERGIES:  No Known Allergies         PAST MEDICAL AND PSYCHIATRIC HISTORY:    Past Medical History:   Diagnosis Date     Depressive disorder      Diabetes (H)      Hypertension            PAST SURGICAL HISTORY:   Past Surgical History:   Procedure Laterality Date     LAMINECTOMY CERVICAL MINIMALLY INVASIVE TWO LEVELS      L4-L5 in 1990's with later redo complicated by dural tear which resulted in foot drop.     ORTHOPEDIC SURGERY       OTS FUSION SPINE POSTERIOR LUMBAR MINIMALLY INVASIVE ONE LEVEL N/A 3/22/2017    Procedure: OPTICAL TRACKING SYSTEM FUSION SPINE POSTERIOR LUMBAR MINIMALLY INVASIVE ONE LEVEL;  Surgeon: Paco Salmeron MD;  Location:  OR           FAMILY HISTORY: family history is not on file.      HEALTH & LIFESTYLE PRACTICES:   Tobacco:  reports that he has never smoked. He does not have any smokeless tobacco history on file.  Alcohol:  reports that he does not drink alcohol.  Illicit drugs:  reports that he uses illicit drugs.       SOCIAL HISTORY: Lives in [place], [place]      LABORATORY VALUES:   Last Basic Metabolic Panel:  Lab Results   Component Value Date     03/15/2017      Lab Results   Component Value Date     POTASSIUM 4.3 03/15/2017     Lab Results   Component Value Date    CHLORIDE 102 03/15/2017     Lab Results   Component Value Date    ZAIRE 8.6 03/15/2017     Lab Results   Component Value Date    CO2 31 03/15/2017     Lab Results   Component Value Date    BUN 17 03/15/2017     Lab Results   Component Value Date    CR 0.86 03/15/2017     Lab Results   Component Value Date     03/15/2017       CBC results:  Lab Results   Component Value Date    WBC 9.4 03/23/2017     Lab Results   Component Value Date    HGB 12.6 03/23/2017     Lab Results   Component Value Date    HCT 38.4 03/23/2017     Lab Results   Component Value Date     03/23/2017       LFT results:  Lab Results   Component Value Date    AST 12 03/15/2017     Lab Results   Component Value Date    ALT 14 03/15/2017     Lab Results   Component Value Date    ALKPHOS 82 03/15/2017         Lab Results   Component Value Date    ALBUMIN 3.2 03/15/2017         Lab Results   Component Value Date    INR 1.06 03/15/2017       UDS: 05/14/15: positive for amphetamines and cannabis.     Labs above reviewed as well as additional relevant diagnostic studies from the EPIC record.       PHYSICAL EXAMINATION:  VITAL SIGNS:  B/P: 99/56, T: 100, P: Data Unavailable, R: 19    CONSTITUTIONAL/GENERAL APPEARANCE: resting comfortably and no acute distress, alert and oriented x 3  HEAD:Normocephalic  NECK: free range of motion  ENT: moist mucous membranes  EYES: PERRLA and Pupils 4mm  PULMONARY:non-labored and clear to auscultation  CHEST WALL:symetrical chest wall expansion  CARDIOVASCULAR:Regular rate and rhythm, acyanotic and peripheral pulses +2 all extremities  ABDOMINAL:bowel sounds positive all quadrants and round abdomen, non-tender to palpation  MUSCULOSKELETAL/BACK/SPINE/EXTREMITIES: 5/5 strength bilateral lower extremities throughout. Lumbar spine tender to palpation paraspinal muscles of the lumbar spine.   GAIT: slow  up with assistance  NEURO:  No allodynia,  SILT all extremities throughout.   SKIN:  Warm, dry, no rashes, dressing to lumbar spine CDI  PSYCHIATRIC/BEHAVIORAL/OBSERVATIONS:     Judgment/Insight - intact   Orientation - oriented x 3   Memory - intact   Mood and affect - tired poor energy    TIME SPENT: 55 minutes including 40 minutes of face-to-face time counseling him  about his pain management treatment options, and coordinating care with the primary team.    Emeterio Cooper CNP  March 23, 2017, 8:45 AM  Inpatient Pain Management Service

## 2017-03-23 NOTE — PLAN OF CARE
Cared for from 1148-4169: AVSS. On 3L NC, has hx of sleep apnea. Back incisions x2 CDI. POD #0 L4-5 decompression, bilateral transforaminal lumbar interbody fusion, and pedicle screw instrumentation. BP improving. A&Ox4. Still c/o constant, sharp pain. Has ketamine drip (in locked box with locked keypad) at 2.5ml/hr and PRN valium given. Neuros intact besides generalized weakness and numbness to left big toe. Brunner with good output. IV infusing 100ml/hr. On a clear liquid diet, can ADAT. Pt repositioning self in bed, will call appropriately if he needs assistance. Continue with POC.

## 2017-03-23 NOTE — PLAN OF CARE
"Problem: Goal Outcome Summary  Goal: Goal Outcome Summary  Outcome: No Change  POD#1 s/p L4-5 Decompression, Bilateral Transforaminal Lumbar Interbody Fusion, Pedicle Screw Instrumentation. VSS on 2L NC; capnography between 7 and 10 IPI and 35 and 50 CO2. A&Ox4. Neuros include generalized weakness; pt denies N/T this shift. C/o terrible pain in lower back treated with sched Tylenol, a ketamine drip, PRN valium and PRN ketamine pushes for some relief; also using lidocaine patches that \"cristal\" help, and taking lyrica and gabapentin. Still endorsing a lot of pain. Incisions covered with small primapore, one with dried drainage another CDI. Regular diet but poor intake; only wanted water and outside food brought by family. PIV @ 100ml/hr. Voiding spont in BR. BS+, hypoactive. Up with 1A+GB, uses IV pole for stabilization at times. Continue to monitor, encourage PO, and follow POC.           "

## 2017-03-23 NOTE — PROGRESS NOTES
[facility], [place]    Neurosurgery Daily Progress Note         Assessment   Jean-Claude Lowry is a 62 year old male who is postoperative day # 1 from Bilateral L4-5 TLIF and placement of L4-5 pedicle screws. .          Plan     Pain control is patient's main concern. Implementing pain management team recommendations. Hesitant to start narcotics given patient's near apnea while sleeping. Appreciate recommendations.     Routine neuro exams per unit protocol.    HOB > 30 degrees.    Incentive spirometry Q1H while awake.    Regular diet.    Bowel regimen. Anti-emetics.     SCDs while in bed.    PT/OT --- mobilize.    Ambulate QID with assistance as tolerated.    Dispo; Stable on 6A.     Please dial * * * and enter job [phone] to reach the neurosurgery team if you have any questions.      Patient and above plan discussed with neurosurgery chief resident.         Subjective     Overnight events: struggling with pain control. Patient does state that his pre-operative pain has improved.          Objective   Temp:  [98.2  F (36.8  C)-100  F (37.8  C)] 100  F (37.8  C)  Heart Rate:  [59-80] 72  Resp:  [8-20] 19  BP: ()/(51-94) 99/56  SpO2:  [89 %-100 %] 94 %    I/O last 3 completed shifts:  In: 3540 [P.O.:390; I.V.:3150]  Out: 2450 [Urine:2400; Blood:50]    Physical Exam  General: Appears comfortable, NAD  Wound: Incision, clean, dry, intact without strikethrough  Neurologic Exam:  - AOx3.  - Follows commands.  - Speech fluent, spontaneous. No aphasia or dysarthria.  - No gaze preference. No apparent hemineglect.  - PERRL, EOMI.  - Face symmetric with sensation intact to light touch.  - Palate elevates symmetrically, uvula midline, tongue protrudes midline.  - Trapezii and sternocleidomastoid muscles 5/5 bilaterally.  - No pronator drift.  Motor: Pain limited weakness in the bilateral lower extremities to 4+/5.     Sensory:  intact to LT      Labs and Imaging     BMPNo lab results found  in last 7 days.  CBC  Recent Labs  Lab 03/22/17  0723   WBC 8.4   RBC 4.86   HGB 14.5   HCT 43.7   MCV 90   MCH 29.8   MCHC 33.2   RDW 13.4          Contact the neurosurgery resident on call with questions.    Dial * * [phone]: Enter [phone] when prompted.   Alden De Los Santos MD, PhD  Neurosurgery PGY-2

## 2017-03-23 NOTE — PLAN OF CARE
Problem: Goal Outcome Summary  Goal: Goal Outcome Summary  OT6A: CX: Pt in too much pain for OT eval this pm. Per PT patient tolerated very little activity today.

## 2017-03-23 NOTE — PROGRESS NOTES
03/23/17 1145   Quick Adds   Type of Visit Initial PT Evaluation   Living Environment   Lives With spouse   Living Arrangements house   Home Accessibility stairs to enter home   Number of Stairs to Enter Home 17   Transportation Available car;family or friend will provide   Self-Care   Usual Activity Tolerance fair   Current Activity Tolerance poor   Regular Exercise no   Functional Level Prior   Ambulation 1-->assistive equipment   Transferring 1-->assistive equipment   Toileting 1-->assistive equipment   Bathing 2-->assistive person   Dressing 2-->assistive person   Eating 0-->independent   Communication 0-->understands/communicates without difficulty   Swallowing 0-->swallows foods/liquids without difficulty   Cognition 0 - no cognition issues reported   Fall history within last six months no   Prior Functional Level Comment Pt reports that his wife has been providing increased assist for ADLs recently 2/2 pt's increasing pain and lower activity tolerance   General Information   Onset of Illness/Injury or Date of Surgery - Date 03/22/17   Referring Physician Destinee Bartlett MD   Patient/Family Goals Statement Improve pain control   Pertinent History of Current Problem (include personal factors and/or comorbidities that impact the POC) 63yo with history of depression and HTN M s/p Bilateral L4-5 TLIF and placement of L4-5 pedicle screws   Precautions/Limitations fall precautions;spinal precautions   General Observations pt demo'ing many pain behaviors.  Greeted in R sidelying position, noted to valsalva often   General Info Comments Activity: up with assist   Cognitive Status Examination   Orientation orientation to person, place and time   Level of Consciousness alert   Follows Commands and Answers Questions 100% of the time   Personal Safety and Judgment intact   Cognitive Comment Pt appears somewhat lethargic but engages appropriately when cued.     Pain Assessment   Patient Currently in Pain Yes, see Vital  Sign flowsheet   Integumentary/Edema   Integumentary/Edema Comments lumbar incisions covered and dry.     Posture    Posture Protracted shoulders;Forward head position   Posture Comments Pt tends to side bend to the R at EOB 2/2 pain   Range of Motion (ROM)   ROM Comment 2/2 pain pt with limitation in tolerable hip flexion, pt achieving ~90degrees max in sitting EOB.  knees and ankle WFL   Strength   Strength Comments MMT not performed 2/2 pain.  pt demo's at least 3/5 BLE's however pt knees flexed during standing and gait   Bed Mobility   Bed Mobility Comments mod A supine<>sit.     Transfer Skills   Transfer Comments Sit<>stand min-mod A x1 to FWW   Gait   Gait Comments Takes a few steps forward/backward with FWW and min A.  Appears unstable at trunk and LE's.  Pain increasing significantly, pt requesting to return to supine   Balance   Balance Comments Impaired in sitting and standing.  Pt demo's variable trunk position at EOB trying to decrease lumbar pain.  In standing pt requires heavy BUE support and min A   Sensory Examination   Sensory Perception Comments diminished light touch R LE in L5 and S1   Coordination   Coordination Comments NT   Muscle Tone   Muscle Tone Comments NT   General Therapy Interventions   Planned Therapy Interventions balance training;bed mobility training;gait training;neuromuscular re-education;ROM;strengthening;stretching;transfer training;home program guidelines;progressive activity/exercise   Clinical Impression   Criteria for Skilled Therapeutic Intervention yes, treatment indicated   PT Diagnosis difficulty walking   Influenced by the following impairments pain, sensory impairments, balance impairments, functional LE weakness   Functional limitations due to impairments decreased (I) bed mobility, gait, stairs ADL   Clinical Presentation Evolving/Changing   Clinical Presentation Rationale Pain continues to be poorly controlled; Pain service has been consulted.  Pt continues to  "require supplemental O2.  Variable CO2 levels.   Clinical Decision Making (Complexity) Moderate complexity   Therapy Frequency` daily   Predicted Duration of Therapy Intervention (days/wks) 3/30/2017   Anticipated Discharge Disposition Transitional Care Facility   Risk & Benefits of therapy have been explained Yes   Patient, Family & other staff in agreement with plan of care Yes   Clinical Impression Comments Pt will benefit from skilled PT to progress functional mobility.  Anticipate that pain will negatively impact rehab potential.   [facility] AM-PAC  \"6 Clicks\" V.2 Basic Mobility Inpatient Short Form   1. Turning from your back to your side while in a flat bed without using bedrails? 2 - A Lot   2. Moving from lying on your back to sitting on the side of a flat bed without using bedrails? 2 - A Lot   3. Moving to and from a bed to a chair (including a wheelchair)? 2 - A Lot   4. Standing up from a chair using your arms (e.g., wheelchair, or bedside chair)? 2 - A Lot   5. To walk in hospital room? 3 - A Little   6. Climbing 3-5 steps with a railing? 2 - A Lot   Basic Mobility Raw Score (Score out of 24.Lower scores equate to lower levels of function) 13   Total Evaluation Time   Total Evaluation Time (Minutes) 8     "

## 2017-03-24 ENCOUNTER — APPOINTMENT (OUTPATIENT)
Dept: GENERAL RADIOLOGY | Facility: CLINIC | Age: 62
DRG: 460 | End: 2017-03-24
Attending: STUDENT IN AN ORGANIZED HEALTH CARE EDUCATION/TRAINING PROGRAM
Payer: MEDICARE

## 2017-03-24 ENCOUNTER — APPOINTMENT (OUTPATIENT)
Dept: PHYSICAL THERAPY | Facility: CLINIC | Age: 62
DRG: 460 | End: 2017-03-24
Attending: NEUROLOGICAL SURGERY
Payer: MEDICARE

## 2017-03-24 ENCOUNTER — APPOINTMENT (OUTPATIENT)
Dept: OCCUPATIONAL THERAPY | Facility: CLINIC | Age: 62
DRG: 460 | End: 2017-03-24
Attending: STUDENT IN AN ORGANIZED HEALTH CARE EDUCATION/TRAINING PROGRAM
Payer: MEDICARE

## 2017-03-24 LAB — GLUCOSE BLDC GLUCOMTR-MCNC: 120 MG/DL (ref 70–99)

## 2017-03-24 PROCEDURE — 25000132 ZZH RX MED GY IP 250 OP 250 PS 637: Mod: GY | Performed by: STUDENT IN AN ORGANIZED HEALTH CARE EDUCATION/TRAINING PROGRAM

## 2017-03-24 PROCEDURE — 97530 THERAPEUTIC ACTIVITIES: CPT | Mod: GP

## 2017-03-24 PROCEDURE — 97116 GAIT TRAINING THERAPY: CPT | Mod: GP

## 2017-03-24 PROCEDURE — 97535 SELF CARE MNGMENT TRAINING: CPT | Mod: GO | Performed by: OCCUPATIONAL THERAPIST

## 2017-03-24 PROCEDURE — 40000133 ZZH STATISTIC OT WARD VISIT: Performed by: OCCUPATIONAL THERAPIST

## 2017-03-24 PROCEDURE — 40000193 ZZH STATISTIC PT WARD VISIT

## 2017-03-24 PROCEDURE — 25000132 ZZH RX MED GY IP 250 OP 250 PS 637: Mod: GY | Performed by: NEUROLOGICAL SURGERY

## 2017-03-24 PROCEDURE — A9270 NON-COVERED ITEM OR SERVICE: HCPCS | Mod: GY | Performed by: NEUROLOGICAL SURGERY

## 2017-03-24 PROCEDURE — 25000128 H RX IP 250 OP 636: Performed by: NURSE PRACTITIONER

## 2017-03-24 PROCEDURE — 12000008 ZZH R&B INTERMEDIATE UMMC

## 2017-03-24 PROCEDURE — 00000146 ZZHCL STATISTIC GLUCOSE BY METER IP

## 2017-03-24 PROCEDURE — A9270 NON-COVERED ITEM OR SERVICE: HCPCS | Mod: GY | Performed by: STUDENT IN AN ORGANIZED HEALTH CARE EDUCATION/TRAINING PROGRAM

## 2017-03-24 PROCEDURE — 72100 X-RAY EXAM L-S SPINE 2/3 VWS: CPT

## 2017-03-24 PROCEDURE — 25000128 H RX IP 250 OP 636: Performed by: STUDENT IN AN ORGANIZED HEALTH CARE EDUCATION/TRAINING PROGRAM

## 2017-03-24 PROCEDURE — 97165 OT EVAL LOW COMPLEX 30 MIN: CPT | Mod: GO | Performed by: OCCUPATIONAL THERAPIST

## 2017-03-24 RX ORDER — HEPARIN SODIUM 5000 [USP'U]/.5ML
5000 INJECTION, SOLUTION INTRAVENOUS; SUBCUTANEOUS EVERY 12 HOURS
Status: DISCONTINUED | OUTPATIENT
Start: 2017-03-24 | End: 2017-03-25 | Stop reason: HOSPADM

## 2017-03-24 RX ORDER — METHOCARBAMOL 500 MG/1
500 TABLET, FILM COATED ORAL EVERY 6 HOURS PRN
Qty: 60 TABLET | Refills: 0 | Status: SHIPPED | OUTPATIENT
Start: 2017-03-24 | End: 2017-04-06

## 2017-03-24 RX ORDER — PREGABALIN 100 MG/1
100 CAPSULE ORAL 3 TIMES DAILY
Qty: 90 CAPSULE | Refills: 0 | Status: SHIPPED | OUTPATIENT
Start: 2017-03-24

## 2017-03-24 RX ORDER — LIDOCAINE 50 MG/G
3 PATCH TOPICAL EVERY 24 HOURS
Qty: 30 PATCH | Refills: 0 | Status: SHIPPED | OUTPATIENT
Start: 2017-03-24 | End: 2017-03-24

## 2017-03-24 RX ORDER — MORPHINE SULFATE 10 MG/5ML
7.5-15 SOLUTION ORAL
Qty: 500 ML | Refills: 0 | Status: SHIPPED | OUTPATIENT
Start: 2017-03-24

## 2017-03-24 RX ORDER — MORPHINE SULFATE 2 MG/ML
1-2 INJECTION, SOLUTION INTRAMUSCULAR; INTRAVENOUS 4 TIMES DAILY
Status: DISCONTINUED | OUTPATIENT
Start: 2017-03-24 | End: 2017-03-25

## 2017-03-24 RX ORDER — ACETAMINOPHEN 500 MG
1000 TABLET ORAL EVERY 8 HOURS
Qty: 90 TABLET | Refills: 0 | Status: SHIPPED | OUTPATIENT
Start: 2017-03-24

## 2017-03-24 RX ORDER — AMOXICILLIN 250 MG
3 CAPSULE ORAL 2 TIMES DAILY
Qty: 60 TABLET | Refills: 0 | Status: SHIPPED | OUTPATIENT
Start: 2017-03-24

## 2017-03-24 RX ORDER — GABAPENTIN 600 MG/1
600 TABLET ORAL 3 TIMES DAILY
Qty: 90 TABLET | Refills: 0 | Status: SHIPPED | OUTPATIENT
Start: 2017-03-24

## 2017-03-24 RX ADMIN — PREGABALIN 100 MG: 100 CAPSULE ORAL at 05:55

## 2017-03-24 RX ADMIN — GABAPENTIN 600 MG: 600 TABLET, FILM COATED ORAL at 13:59

## 2017-03-24 RX ADMIN — PREGABALIN 100 MG: 100 CAPSULE ORAL at 13:59

## 2017-03-24 RX ADMIN — SENNOSIDES AND DOCUSATE SODIUM 3 TABLET: 8.6; 5 TABLET ORAL at 19:57

## 2017-03-24 RX ADMIN — MORPHINE SULFATE 15 MG: 10 SOLUTION ORAL at 02:31

## 2017-03-24 RX ADMIN — MORPHINE SULFATE 15 MG: 10 SOLUTION ORAL at 11:55

## 2017-03-24 RX ADMIN — DOCUSATE SODIUM 100 MG: 100 CAPSULE, LIQUID FILLED ORAL at 19:57

## 2017-03-24 RX ADMIN — MORPHINE SULFATE 15 MG: 10 SOLUTION ORAL at 17:41

## 2017-03-24 RX ADMIN — DULOXETINE 60 MG: 60 CAPSULE, DELAYED RELEASE ORAL at 21:27

## 2017-03-24 RX ADMIN — MORPHINE SULFATE 15 MG: 10 SOLUTION ORAL at 05:56

## 2017-03-24 RX ADMIN — PANTOPRAZOLE SODIUM 40 MG: 40 TABLET, DELAYED RELEASE ORAL at 07:36

## 2017-03-24 RX ADMIN — ACETAMINOPHEN 1000 MG: 500 TABLET, FILM COATED ORAL at 14:01

## 2017-03-24 RX ADMIN — SENNOSIDES AND DOCUSATE SODIUM 3 TABLET: 8.6; 5 TABLET ORAL at 07:36

## 2017-03-24 RX ADMIN — HEPARIN SODIUM 5000 UNITS: 5000 INJECTION, SOLUTION INTRAVENOUS; SUBCUTANEOUS at 11:56

## 2017-03-24 RX ADMIN — LIDOCAINE 3 PATCH: 50 PATCH TOPICAL at 07:33

## 2017-03-24 RX ADMIN — MORPHINE SULFATE 15 MG: 10 SOLUTION ORAL at 21:34

## 2017-03-24 RX ADMIN — PREGABALIN 100 MG: 100 CAPSULE ORAL at 21:27

## 2017-03-24 RX ADMIN — TRAZODONE HYDROCHLORIDE 300 MG: 150 TABLET ORAL at 21:27

## 2017-03-24 RX ADMIN — GABAPENTIN 600 MG: 600 TABLET, FILM COATED ORAL at 21:27

## 2017-03-24 RX ADMIN — MORPHINE SULFATE 15 MG: 10 SOLUTION ORAL at 09:03

## 2017-03-24 RX ADMIN — MORPHINE SULFATE 15 MG: 10 SOLUTION ORAL at 14:59

## 2017-03-24 RX ADMIN — ACETAMINOPHEN 1000 MG: 500 TABLET, FILM COATED ORAL at 05:55

## 2017-03-24 RX ADMIN — GABAPENTIN 600 MG: 600 TABLET, FILM COATED ORAL at 05:55

## 2017-03-24 RX ADMIN — MORPHINE SULFATE 2 MG: 2 INJECTION, SOLUTION INTRAMUSCULAR; INTRAVENOUS at 19:57

## 2017-03-24 RX ADMIN — LEDIPASVIR AND SOFOSBUVIR 1 TABLET: 90; 400 TABLET, FILM COATED ORAL at 05:56

## 2017-03-24 RX ADMIN — ACETAMINOPHEN 1000 MG: 500 TABLET, FILM COATED ORAL at 21:30

## 2017-03-24 RX ADMIN — SODIUM CHLORIDE: 9 INJECTION, SOLUTION INTRAVENOUS at 07:32

## 2017-03-24 ASSESSMENT — PAIN DESCRIPTION - DESCRIPTORS
DESCRIPTORS: ACHING
DESCRIPTORS: ACHING

## 2017-03-24 ASSESSMENT — VISUAL ACUITY
OU: NORMAL ACUITY

## 2017-03-24 ASSESSMENT — ACTIVITIES OF DAILY LIVING (ADL): PREVIOUS_RESPONSIBILITIES: HOUSEKEEPING;LAUNDRY;SHOPPING;DRIVING

## 2017-03-24 NOTE — PROGRESS NOTES
Inpatient Pain Management Service: Follow Up Note    The inpatient pain service is continuing to follow Jean-Claude Lowry for his post operative pain at the request of his hospital team.    ASSESSMENT:   1. Acute post-operative pain status post L4-5 decompression, bilateral transformational interbody fusion with pedicle screw instrumentation on 03/22/17. - Some pain still but slightly improved compared to yesterday and able to get up to go to the bathroom.   2. Sedation vs pain report. Patient sleeping comfortably upon entering his room. Patient arouses to verbal stimuli. RN reports indicate patient sedated over night with reports on intolerable pain whenever arouses. - patient didn't appear lethargic during our conversation and was able to converse without dozing off.   3. Chronic back pain, with spondylolisthesis  4. History of HTN  5. History of Diabetes  6. Depression  7. BMI of 30 with ANNE MARIE in setting of opioids, increased risk of respiratory depression  8. History of polysubstance use disorder: Oxycontin, Marijuana and illicit drugs, patient reports he uses marijuana daily about 5 times per day, and last used on the morning of surgery. He also reports using any illicit drug available to him during the 70s and 80s. He reports his last use of illicit drugs was over 20 years ago. However, patient has a positive Urine Drug Screen from 05/14/15 that was positive for amphetamines with cannabis.  9. Patient is on suboxone for maintenance treatment, managed by Dr. Koki Alcantar.     Outpatient medications related to pain prior to admission:   --buprenorphine-naloxone 4-1mg SL film, BID; filled 3/1/17  --gabapentin 600mg, 300mg, 600mg, PO Q 8 hours; filled 2/28/17  --pregabalin 100mg PO BID; filled 3/10/17     Outpatient buprenorphine-naloxone prescribed by: Koki Alcantar MD (addictionologist)  PRIMARY CARE PROVIDER: Quagn Mejia  PAIN SPECIALIST: none   [place] reviewed: reviewed, consistent  prescribing of Suboxone, gabapentin and pregabalin.     RECOMMENDATIONS/PLAN:   1. Decrease IV morphine to 1-2mg IV QID as needed with physical therapy and breakthrough pain     Discontinue 24 hours prior to discharge.   2. Continue morphine solution, 7.5-15mg by mouth every 3 hour as needed moderate-severe pain    Discharge taper recommendations will be based on home vs TCU. Please see below depending on where patient goes.   3. Continue gabapentin 600mg by mouth every 8 hours    At discharge, continue gabapentin 600mg by mouth every 8 hours x 5 days then decrease to home regimen of gabapentin [id number] by mouth every 8 hours  4. Continue methocarbamol 500mg by mouth every 6 hour as needed muscle spasms    At discharge, discontinue   5. Continue pregabalin 100mg by mouth every 8 hours    At discharge continue current regimen as this is patient's home regimen   6. Continue acetaminophen 1000mg by mouth every 8 hours    At discharge, continue current regimen x 7 days then decrease to acetaminophen 975mg by mouth every 8 hour as needed   7. Continue Lidoderm 5% patches, 3 patches TD every 24 hours (12 hours on and 12 hours off)     At discharge, continue current regimen for 7 days then discontinue   8. Bowel regimen per primary team to prevent opioid induced constipation.     We tried to reach out to Dr. Acevedo who manages the patient's suboxone to discuss the below plans, but she has not followed up yet.     Discharge to Home    Patient appears high risk for relapse and would highly recommend resuming patient's home suboxone with a quick taper off current opioid regimen. He has a past history of intentional overdose with opiates back in 04/2016    Prescribe 2 day supplies with dated scripts    At discharge change to morphine 15mg tablet 1 tablet by mouth every 4 hour as needed x 1 day then start decreasing daily limit by 1 tablet every day until completely off.     Patient will need to follow up with   [facility] to resume suboxone    Discharge to TCU    At discharge change to morphine 15mg tablet, 1 tablet by mouth every 4 hour as needed x 2 days, then start decreasing daily limit by 1 tablet every 2 days until completley off.     Patient will need to follow up with Dr. Alcantar to resume suboxone    Paged Resident on Call Destinee Bartlett but no response.   Plan was reviewed by the Pain Service consisting of Dr. Chay Kurtz    Thank you for consulting the Inpatient Pain Management Service.   The above recommendations are to be acted upon at the primary team s discretion.     To reach us:  Mon - Friday 8 AM - 3 PM: Pager [phone]   After hours, weekends and holidays: Primary service should call [phone] for the on-call pain specialist    CHIEF PAIN COMPLAINT: back pain    CAPA (Clinically Aligned Pain Assessment):    Comfort (How is your pain?): Tolerable with discomfort  Change in Pain (Since your last medication/intervention?): About the same  Pain Control (How are your pain treatments working?):  Partially effective control  Functioning (Are you able to do activities to get better?) : Can do most things, but pain gets in the way of some   Sleep (Does your pain management allow you to sleep or rest?): Awake with occasional pain         INTERVAL HISTORY:   Jean-Claude was seen today and reports slightly improved pain control from yesterday but able to manage throughout the day. Pain is still located in his lower back and he reports that the first time getting up to go the bathroom was painful, but subsequent times were better. He reported some muscle spasms yesterday, but reports that he hasn't experienced any today. We made it known to him that he still has his methocarbamol ordered in case he does have muscle spasms throughout the day. We talked to him about plans moving forward and how we would need to wean him off the IV opioids before sending him off home as this won't be available when he discharges and  how we would limit it to four times a day starting today. We also discussed how the long term goal is to wean him off the short acting opioids to get him back on the suboxone that he was on prior to the procedure.     Patient does not endorse any headaches, changes in vision, skin rash, chest pain, shortness of breath, abdominal pain, muscle pain, or extremity weakness.     FUNCTIONAL STATUS:  Change:      staying the same  Oral intake:     Regular  Bowel function:    No bowel movements since 3/21/17  Activity level:     Up with assistance ambulating in room  Sleep:      bedtime struggles  Mood:      adjusting to situation    Medications related to Pain Management:   Opioid Analgesics  -- morphine 1-2mg IV every 2 hour as needed (2mg within the last 24 hours 7AM-7AM)  -- morphine solution 7.5-15mg by mouth every 3 hour as needed (75mg within the last 24 hours 7AM-7AM)    Adjuvants/CoAnalgesics  -- acetaminophen 1000mg by mouth every 8 hours  -- gabapentin 600mg by mouth every 8 hours  -- Lidoderm 5% patches, 3 patches TD every 24 hours (12 hours on and 12 hours off)   -- methocarbamol 500mg by mouth every 6 hour as needed (0 within the last 24 hours 7AM-7AM)  -- pregabalin 100mg by mouth every 8 hours    Antidepressants/Anxiolytics  -- Duloxetine 60mg by mouth at bedtime     Laxatives/Stool Softeners  -- Senokot-S, 3 tablets by mouth BID     CURRENT MEDICATIONS:   Current Facility-Administered Medications Ordered in Epic   Medication Dose Route Frequency Provider Last Rate Last Dose     lidocaine (LIDODERM) 5 % Patch 3 patch  3 patch Transdermal Q24H Alden De Los Santos MD   3 patch at 03/24/17 0733     lidocaine (LIDODERM) patch REMOVAL   Transdermal Q24h Alden De Los Santos MD         lidocaine (LIDODERM) Patch in Place   Transdermal Q8H Alden De Los Santos MD         morphine solution 7.5-15 mg  7.5-15 mg Oral Q3H PRN Destinee Bartlett MD   15 mg at 03/24/17 0903     morphine (PF) injection 1-2 mg  1-2 mg Intravenous  Q2H PRN Destinee Bartlett MD   2 mg at 03/23/17 1959     methocarbamol (ROBAXIN) tablet 500 mg  500 mg Oral Q6H PRN Destinee Bartlett MD         gabapentin (NEURONTIN) tablet 600 mg  600 mg Oral Q8H Destinee Bartlett MD   600 mg at 03/24/17 0555     pregabalin (LYRICA) capsule 100 mg  100 mg Oral Q8H Destinee Bartlett MD   100 mg at 03/24/17 0555     DULoxetine (CYMBALTA) EC capsule 60 mg  60 mg Oral At Bedtime Destinee Bartlett MD   60 mg at 03/23/17 2055     ledipasvir-sofosbuvir (HARVONI)  MG per tablet TABS 1 tablet  1 tablet Oral Destinee Flynn MD   1 tablet at 03/24/17 0556     pantoprazole (PROTONIX) EC tablet 40 mg  40 mg Oral Destinee Flynn MD   40 mg at 03/24/17 0736     traZODone (DESYREL) tablet 150-300 mg  150-300 mg Oral At Bedtime Destinee Bartlett MD   150 mg at 03/23/17 2057     lidocaine 1 % 1 mL  1 mL Other Q1H PRN Destinee Batrlett MD         lidocaine (LMX4) kit   Topical Q1H PRN Destinee Bartlett MD         sodium chloride (PF) 0.9% PF flush 3 mL  3 mL Intracatheter Q1H PRN Destinee Bartlett MD         sodium chloride (PF) 0.9% PF flush 3 mL  3 mL Intracatheter Q8H Destinee Bartlett MD   3 mL at 03/23/17 1702     naloxone (NARCAN) injection 0.1-0.4 mg  0.1-0.4 mg Intravenous Q2 Min PRN Destinee Bartlett MD         0.9% sodium chloride infusion   Intravenous Continuous Destinee Bartlett  mL/hr at 03/24/17 0732       ondansetron (ZOFRAN-ODT) ODT tab 4 mg  4 mg Oral Q6H PRN Destinee Bartlett MD        Or     ondansetron (ZOFRAN) injection 4 mg  4 mg Intravenous Q6H PRN Destinee Bartlett MD   4 mg at 03/22/17 1643     prochlorperazine (COMPAZINE) injection 5-10 mg  5-10 mg Intravenous Q6H PRN Destinee Bartlett MD        Or     prochlorperazine (COMPAZINE) tablet 5-10 mg  5-10 mg Oral Q6H PRN Destinee Bartlett MD         docusate sodium (COLACE) capsule 100 mg  100 mg Oral BID Destinee Bartlett MD   100 mg at 03/23/17 2000     acetaminophen (TYLENOL) tablet 1,000 mg  1,000  mg Oral Q8H Destinee Bartlett MD   1,000 mg at 03/24/17 0555     senna-docusate (SENOKOT-S;PERICOLACE) 8.6-50 MG per tablet 3 tablet  3 tablet Oral BID Alden De Los Santos MD   3 tablet at 03/24/17 0736     No current [facility]-ordered outpatient prescriptions on file.     VITALS:    Temp:  [97.8  F (36.6  C)-99.5  F (37.5  C)] 98.1  F (36.7  C)  Pulse:  [70] 70  Heart Rate:  [67-84] 70  Resp:  [14-18] 15  BP: ()/(46-56) 104/46  SpO2:  [91 %-100 %] 93 %  Patient Vitals for the past 8 hrs:   BP Temp Temp src Pulse Heart Rate Resp SpO2   03/24/17 0731 104/46 98.1  F (36.7  C) Oral - 70 15 93 %   03/24/17 0300 117/48 97.8  F (36.6  C) Oral 70 - 17 96 %     LAB DATA:  Results for orders placed or performed during the hospital encounter of 03/22/17 (from the past 24 hour(s))   Glucose by meter   Result Value Ref Range    Glucose 120 (H) 70 - 99 mg/dL       Henrik Javier, PharmD, [facility]  Inpatient Acute Pain Service

## 2017-03-24 NOTE — PROGRESS NOTES
SPIRITUAL HEALTH SERVICES  SPIRITUAL ASSESSMENT Progress Note  [facility] ([place]) 6A    Visited Jean-Claude briefly in his room.  Jean-Claude talked to  briefly but he was very tired from medications. Jean-Claude told  that he was feeling much better after back surgery and hoped to get well soon. Told Jean-Claude that I will come back another day to see you or the  on this floor will come to visit you.        Chris Dial   Intern  Pager [phone]

## 2017-03-24 NOTE — PROGRESS NOTES
Care Coordinator Progress Note     Admission Date/Time:  3/22/2017  Attending MD:  Dr Paco Salmeron       Data  Chart reviewed, discussed with interdisciplinary team. In 6A Discharge Rounds Kell Nicholson, MARC, reported Ketamine drip was discontinued. Pain Service is following.   Yesterday PT was recommending TCU.     This AM PT reported pt did much better; they recommend home PT & OT.   ______________________________________________    Patient was admitted for:  L4-L5 lumbar spondylolisthesis with stenosis & radiculopathy.   Procedure 3-22-17:  Bilateral L4-L5 transforaminal lumbar interbody fusion; placement of L4-L5 pedicle screw fixation.      Past history includes: Hepatitis C; sleep apnea; GERD; chronic pain & depression; diabetes.   Per 3-15-17 outpt H&P: pt uses marijuana daily. He was recently diagnosed with sleep apnea and just fitted with CPAP. Recently started on Harvoni for Hep C.     Pt is followed by Dr Alcantar at [facility] and was on Suboxone (held for surgery). Pt was evaluated by Pharmacy prior to admission, see note from 3-15-17 by Henrik Javier, Pharmacist.   ____________________________________________     Coordination of Care and Referrals  Introduced myself to pt and explained I help with discharge planning. Explained PT was recommending home PT & OT. Pt said he was doing outpt PT at [facility]. He wanted to continue outpt therapy. Informed pt we can have outpt therapy orders on the DC orders.   Pt said he manages his own medications. He denied any other needs at this time.  After I met with pt I spoke again with OTILIO Juarez. She said pt has difficulty getting out of a chair. We discussed if pt will be able to get to outpt therapy. Will wait for OT recommendations and then re-evaluate if pt would benefit from some home therapy first. Will re-address with pt if needed.    I called [facility] at [facility] in [facility]. Phone: [phone]. Pt was last  seen there on 3-17-17. They do not have OT at that site, only PT. Fax: [phone].      Assessment  Home PT recommended but pt prefers to resume outpt therapies he was doing prior to admission.      Plan  Anticipated Discharge Date:  TBD  Anticipated Discharge Plan:   Outpt PT at [facility].   --OT consult pending.     --Pt will need to follow-up with Dr Koki Alcantar, Addiction Specialist for Suboxone plan.   --Update DC orders with final PT/OT orders.       Becky Rivera, RN Care Coordinator  Unit 6A, [facility]

## 2017-03-24 NOTE — DISCHARGE SUMMARY
[facility] Discharge Summary and Instructions    Jean-Claude Lowry MRN# [id number]   Age: 62 year old YOB: 1955     Date of Admission:  3/22/2017  Date of Discharge::  3/25/2017  4:27 PM  Admitting Physician:  Paco Salmeron MD  Discharge Physician:  Paco Salmeron MD          Admission Diagnoses:   Spondylolisthesis of Lumbar Region   S/P spinal fusion          Discharge Diagnosis:   Spondylolisthesis of Lumbar Region   S/P spinal fusion          Procedures:   1. Bilateral L4-5 decompression  2. Bilateral L4-L5 transforaminal lumbar interbody fusion.   3. Placement of L4-L5 pedicle screw fixation.            Brief History of Illness:   Mr. Jean-Claude Lowry is a 62-year-old gentleman who presented to the Neurosurgery Clinic at the [facility] with concerns of low back pain for about 1-1/2 years. It is designated in his lower back and radiated across his left buttock in L4 distribution. He had a prior history of bilateral L4-L5 laminectomy many years ago and subsequent L5-S1 fusion. Due to spondylolisthesis with stenosis and imaging findings, he was consented for decompression as well as fusion surgery, as mentioned above, after risks, benefits and alternatives for the procedure were explained to him in detail.            Hospital Course:   Following surgery the patient endorsed incisional pain, leg pain had improved.  The patient does have a history of chronic pain issues however a regimen of medication was found to make his symptoms tolerable. The patient was evaluated by therapies who felt he was safe to discharge home with family.  Prior to discharge the patient was voiding, passing bowel movements, medically stable and tolerating diet.      Due to the patient's chronic narcotic needs, pain management was consulted.  Their plan as listed below:   RECOMMENDATIONS/PLAN:   1. Decrease IV morphine to 1-2mg IV QID as needed with physical therapy and  breakthrough pain     Discontinue 24 hours prior to discharge.   2. Continue morphine solution, 7.5-15mg by mouth every 3 hour as needed moderate-severe pain    Discharge taper recommendations will be based on home vs TCU. Please see below depending on where patient goes.   3. Continue gabapentin 600mg by mouth every 8 hours    At discharge, continue gabapentin 600mg by mouth every 8 hours x 5 days then decrease to home regimen of gabapentin [id number] by mouth every 8 hours  4. Continue methocarbamol 500mg by mouth every 6 hour as needed muscle spasms    At discharge, discontinue   5. Continue pregabalin 100mg by mouth every 8 hours    At discharge continue current regimen as this is patient's home regimen   6. Continue acetaminophen 1000mg by mouth every 8 hours    At discharge, continue current regimen x 7 days then decrease to acetaminophen 975mg by mouth every 8 hour as needed   7. Continue Lidoderm 5% patches, 3 patches TD every 24 hours (12 hours on and 12 hours off)     At discharge, continue current regimen for 7 days then discontinue   8. Bowel regimen per primary team to prevent opioid induced constipation.      We tried to reach out to Dr. Acevedo who manages the patient's suboxone           Discharge Medications:     Current Discharge Medication List      START taking these medications    Details   acetaminophen (TYLENOL) 500 MG tablet Take 2 tablets (1,000 mg) by mouth every 8 hours  Qty: 90 tablet, Refills: 0    Comments: Pain  Associated Diagnoses: S/P spinal fusion      morphine 10 MG/5ML solution Take 3.75-7.5 mLs (7.5-15 mg) by mouth every 3 hours as needed for moderate to severe pain  Qty: 500 mL, Refills: 0    Associated Diagnoses: S/P spinal fusion      senna-docusate (SENOKOT-S;PERICOLACE) 8.6-50 MG per tablet Take 3 tablets by mouth 2 times daily  Qty: 60 tablet, Refills: 0    Comments: constipation  Associated Diagnoses: S/P spinal fusion      methocarbamol (ROBAXIN) 500 MG tablet  Take 1 tablet (500 mg) by mouth every 6 hours as needed for muscle spasms  Qty: 60 tablet, Refills: 0    Associated Diagnoses: S/P spinal fusion      lidocaine (LIDODERM) 5 % Patch Place 3 patches onto the skin every 24 hours  Qty: 30 patch, Refills: 0    Associated Diagnoses: S/P spinal fusion         CONTINUE these medications which have CHANGED    Details   gabapentin (NEURONTIN) 600 MG tablet Take 1 tablet (600 mg) by mouth 3 times daily 600 mg AM, 300 mg afternoon, 600 mg HS  Qty: 90 tablet, Refills: 0    Comments: Neuropathic pain  Associated Diagnoses: S/P spinal fusion      pregabalin (LYRICA) 100 MG capsule Take 1 capsule (100 mg) by mouth 3 times daily  Qty: 90 capsule, Refills: 0    Comments: Neuropathic pain  Associated Diagnoses: Spondylolisthesis of lumbar region; Spinal stenosis of lumbar region with radiculopathy         CONTINUE these medications which have NOT CHANGED    Details   ledipasvir-sofosbuvir (HARVONI)  MG per tablet Take 1 tablet by mouth every morning    Associated Diagnoses: Lumbar stenosis      RABEprazole Sodium (ACIPHEX PO) Take 20 mg by mouth every morning       DULoxetine HCl (CYMBALTA PO) Take 60 mg by mouth At Bedtime       TRAZODONE HCL PO Take 150-300 mg by mouth At Bedtime         STOP taking these medications       buprenorphine HCl-naloxone HCl (SUBOXONE) 4-1 MG per film Comments:   Reason for Stopping:           Physical Exam  General: Appears comfortable, NAD  Wound: Incision, clean, dry, intact  Neurologic Exam:  - AOx3.  - Follows commands.  - Speech fluent, spontaneous. No aphasia or dysarthria.  - No gaze preference. No apparent hemineglect.  - PERRL, EOMI.  - Face symmetric with sensation intact to light touch.  - Palate elevates symmetrically, uvula midline, tongue protrudes midline.  - Trapezii and sternocleidomastoid muscles 5/5 bilaterally.  - No pronator drift.  Motor: Pain limited weakness in the bilateral lower extremities to 4+/5.      Sensory:  intact  to LT            Discharge Instructions and Follow-Up:   Discharge diet: Regular   Discharge activity: You may advance activity as tolerated. No strenuous exercise or heay lifting greater than 10 lbs for 4 weeks or until seen and cleared in clinic.   Discharge follow-up: Follow-up with Dr. Paco Salmeron MD in 6 weeks    Follow up with Ramona Mayer in 2 weeks for wound check   Wound care: Ok to shower,however no scrubbing of the wound and no soaking of the wound, meaning no bathtubs or swimming pools. Pat dry only. Leave wound open to air.  Sutures are not absorbable and need to be removed in 2 weeks. If patient still at rehab by this time, the sutures may be removed by the rehab physician if he or she considers that the wound has healed completely.     Please call if you have:  1. increased pain, redness, drainage, swelling at your incision  2. fevers > 101.5 F degrees  3. with any questions or concerns.  You may reach the Neurosurgery clinic at [phone] during regular work hours. ER at [phone].    and ask for the Neurosurgery Resident on call at [phone], during off hours or weekends.         Discharge Disposition:   Discharged to short-term care facility

## 2017-03-24 NOTE — PROGRESS NOTES
[facility], [place]    Neurosurgery Daily Progress Note         Assessment   Jean-Claude Lowry is a 62 year old male who is postoperative day # 2 from Bilateral L4-5 TLIF and placement of L4-5 pedicle screws. .          Plan     Pain control is patient's main concern. Implementing pain management team recommendations. Discontinuing ketamine. Starting morphine.     Routine neuro exams per unit protocol.    HOB > 30 degrees.    Incentive spirometry Q1H while awake.    Regular diet.    Bowel regimen. Anti-emetics.     SCDs while in bed.    PT/OT --- mobilize.    Ambulate QID with assistance as tolerated.    Dispo; Stable on 6A.     Please dial * * * and enter job code [phone] to reach the neurosurgery team if you have any questions.      Patient and above plan discussed with neurosurgery chief resident.         Subjective     Overnight events:  Pain control improved. Needs to get up and mobilize.          Objective   Temp:  [97.8  F (36.6  C)-99.5  F (37.5  C)] 97.8  F (36.6  C)  Pulse:  [70] 70  Heart Rate:  [67-87] 67  Resp:  [14-18] 17  BP: ()/(45-56) 117/48  SpO2:  [91 %-100 %] 96 %    I/O last 3 completed shifts:  In: 2720 [P.O.:320; I.V.:2400]  Out: 3900 [Urine:3900]    Physical Exam  General: Appears comfortable, NAD  Wound: Incision, clean, dry, intact  Neurologic Exam:  - AOx3.  - Follows commands.  - Speech fluent, spontaneous. No aphasia or dysarthria.  - No gaze preference. No apparent hemineglect.  - PERRL, EOMI.  - Face symmetric with sensation intact to light touch.  - Palate elevates symmetrically, uvula midline, tongue protrudes midline.  - Trapezii and sternocleidomastoid muscles 5/5 bilaterally.  - No pronator drift.  Motor: Pain limited weakness in the bilateral lower extremities to 4+/5.     Sensory:  intact to LT      Labs and Imaging     BMP    Recent Labs  Lab 03/23/17  0743      POTASSIUM 4.1   CHLORIDE 103   ZAIRE 8.3*   CO2 28   BUN 13   CR 0.89   *      CBC    Recent Labs  Lab 03/23/17  0743 03/22/17  0723   WBC 9.4 8.4   RBC 4.29* 4.86   HGB 12.6* 14.5   HCT 38.4* 43.7   MCV 90 90   MCH 29.4 29.8   MCHC 32.8 33.2   RDW 13.0 13.4   * 153       Contact the neurosurgery resident on call with questions.    Dial * * *[phone]: Enter [phone] when prompted.   Alden De Los Santos MD, PhD  Neurosurgery PGY-2

## 2017-03-24 NOTE — PLAN OF CARE
Problem: Goal Outcome Summary  Goal: Goal Outcome Summary  PT 6A:  Pt with much improved activity tolerance today, still limited in most in transfers by lumbar pain.  Amb x125ft x2 bouts with FWW, SBA.  Up/down [id number] with L rail, CGA.  O2 sats stable on room air throughout.      Given significant improvement in functional mobility, anticipate that from a mobility standpoint pt will be able to d/c home with assist from wife and either HH or OP PT.

## 2017-03-24 NOTE — PLAN OF CARE
Problem: Goal Outcome Summary  Goal: Goal Outcome Summary  Outcome: Improving  Patient s/pL4-5 decomp lami and fusion.POD #2 Back incision dressings dry and intact. LE strength 4/5 with UE 5/5. Denies Numbness and tingling in LE. Ambulated to bathroom with assist of one and IV pole. Voiding large amount but not all in hat to get total volume. IV at 100 cc/ hour drinking pop and water. Denies nausea. Sleeping well between checks with scheduled tylenol and PRN morphine PO for pain control. Assist with turning. 2L O2 per NC, VSS Afebrile. Continue to monitor and treat per plan of care.

## 2017-03-24 NOTE — PLAN OF CARE
Problem: Individualization  Goal: Patient Preferences  Outcome: Improving  VSS, weaned off of O2. Back pain controlled with po morphine and scheduled medications. Neuros intact. Back dressings removed by MD. Good po intake, sat in chair for meals. PIV sl'd. Up with 1 and gb. Voiding spontaneously, passing gas but no bm since surgery. Worked with PT. Unsure of DC plans at this time.

## 2017-03-24 NOTE — PLAN OF CARE
Problem: Goal Outcome Summary  Goal: Goal Outcome Summary  Outcome: No Change  POD 1 L4-5 decompression. AOX4, VSS on 2L. On capnography: Et CO2 40-50 and IPI 7-10. Set to  this afternoon, but pain meds changed and h/o sleep apnea. Neuros intact.  Mobility limited by pain.  Up w A1, GB and uses IV pole. Voiding spont. No BM, cont bowel regimen. MIVFs infusing at 100. Poor to moderate appetite. Pain moderately controlled w Morphine PO/IV, Gabapentin, Lyrica and Lidocaine patches. C/t monitor.

## 2017-03-24 NOTE — PLAN OF CARE
Problem: Goal Outcome Summary  Goal: Goal Outcome Summary  OT 6A: Evaluation complete and treatment initiated.  Pt limited by pain and post surgical precautions.  SBA for bed mobility, CGA with FWW for ambulation of household distances.  Min A for toileting and mod A for clothing management.  Pending safe stair completion and availability of necessary assistance; Anticipate pt will be able to discharge to home with initial 24hr assist (until can demonstrate independence with toileting and clothing management) and home OT/PT.  Pt may benefit from obtaining shower chair and reacher.

## 2017-03-25 ENCOUNTER — APPOINTMENT (OUTPATIENT)
Dept: OCCUPATIONAL THERAPY | Facility: CLINIC | Age: 62
DRG: 460 | End: 2017-03-25
Attending: NEUROLOGICAL SURGERY
Payer: MEDICARE

## 2017-03-25 ENCOUNTER — APPOINTMENT (OUTPATIENT)
Dept: PHYSICAL THERAPY | Facility: CLINIC | Age: 62
DRG: 460 | End: 2017-03-25
Attending: NEUROLOGICAL SURGERY
Payer: MEDICARE

## 2017-03-25 VITALS
WEIGHT: 179.23 LBS | HEART RATE: 82 BPM | RESPIRATION RATE: 16 BRPM | TEMPERATURE: 98.3 F | BODY MASS INDEX: 30.6 KG/M2 | OXYGEN SATURATION: 92 % | HEIGHT: 64 IN | SYSTOLIC BLOOD PRESSURE: 115 MMHG | DIASTOLIC BLOOD PRESSURE: 58 MMHG

## 2017-03-25 PROCEDURE — 25000132 ZZH RX MED GY IP 250 OP 250 PS 637: Mod: GY | Performed by: NEUROLOGICAL SURGERY

## 2017-03-25 PROCEDURE — 25000128 H RX IP 250 OP 636: Performed by: STUDENT IN AN ORGANIZED HEALTH CARE EDUCATION/TRAINING PROGRAM

## 2017-03-25 PROCEDURE — A9270 NON-COVERED ITEM OR SERVICE: HCPCS | Mod: GY | Performed by: NEUROLOGICAL SURGERY

## 2017-03-25 PROCEDURE — 40000133 ZZH STATISTIC OT WARD VISIT: Performed by: OCCUPATIONAL THERAPIST

## 2017-03-25 PROCEDURE — 97116 GAIT TRAINING THERAPY: CPT | Mod: GP

## 2017-03-25 PROCEDURE — 97530 THERAPEUTIC ACTIVITIES: CPT | Mod: GP

## 2017-03-25 PROCEDURE — A9270 NON-COVERED ITEM OR SERVICE: HCPCS | Mod: GY | Performed by: STUDENT IN AN ORGANIZED HEALTH CARE EDUCATION/TRAINING PROGRAM

## 2017-03-25 PROCEDURE — 97535 SELF CARE MNGMENT TRAINING: CPT | Mod: GO | Performed by: OCCUPATIONAL THERAPIST

## 2017-03-25 PROCEDURE — 40000193 ZZH STATISTIC PT WARD VISIT

## 2017-03-25 PROCEDURE — 40000141 ZZH STATISTIC PERIPHERAL IV START W/O US GUIDANCE

## 2017-03-25 PROCEDURE — 25000132 ZZH RX MED GY IP 250 OP 250 PS 637: Mod: GY | Performed by: STUDENT IN AN ORGANIZED HEALTH CARE EDUCATION/TRAINING PROGRAM

## 2017-03-25 RX ADMIN — SENNOSIDES AND DOCUSATE SODIUM 3 TABLET: 8.6; 5 TABLET ORAL at 07:46

## 2017-03-25 RX ADMIN — ACETAMINOPHEN 1000 MG: 500 TABLET, FILM COATED ORAL at 14:11

## 2017-03-25 RX ADMIN — PANTOPRAZOLE SODIUM 40 MG: 40 TABLET, DELAYED RELEASE ORAL at 07:46

## 2017-03-25 RX ADMIN — PREGABALIN 100 MG: 100 CAPSULE ORAL at 06:29

## 2017-03-25 RX ADMIN — LEDIPASVIR AND SOFOSBUVIR 1 TABLET: 90; 400 TABLET, FILM COATED ORAL at 06:30

## 2017-03-25 RX ADMIN — MORPHINE SULFATE 15 MG: 10 SOLUTION ORAL at 11:56

## 2017-03-25 RX ADMIN — ACETAMINOPHEN 1000 MG: 500 TABLET, FILM COATED ORAL at 06:29

## 2017-03-25 RX ADMIN — MORPHINE SULFATE 10 MG: 10 SOLUTION ORAL at 03:52

## 2017-03-25 RX ADMIN — GABAPENTIN 600 MG: 600 TABLET, FILM COATED ORAL at 14:11

## 2017-03-25 RX ADMIN — MORPHINE SULFATE 15 MG: 10 SOLUTION ORAL at 15:05

## 2017-03-25 RX ADMIN — LIDOCAINE 3 PATCH: 50 PATCH TOPICAL at 11:57

## 2017-03-25 RX ADMIN — PREGABALIN 100 MG: 100 CAPSULE ORAL at 14:11

## 2017-03-25 RX ADMIN — MORPHINE SULFATE 2 MG: 2 INJECTION, SOLUTION INTRAMUSCULAR; INTRAVENOUS at 07:48

## 2017-03-25 RX ADMIN — METHOCARBAMOL 500 MG: 500 TABLET ORAL at 14:15

## 2017-03-25 RX ADMIN — DOCUSATE SODIUM 100 MG: 100 CAPSULE, LIQUID FILLED ORAL at 07:46

## 2017-03-25 RX ADMIN — GABAPENTIN 600 MG: 600 TABLET, FILM COATED ORAL at 06:29

## 2017-03-25 ASSESSMENT — VISUAL ACUITY
OU: NORMAL ACUITY
OU: NORMAL ACUITY

## 2017-03-25 ASSESSMENT — PAIN DESCRIPTION - DESCRIPTORS
DESCRIPTORS: ACHING
DESCRIPTORS: ACHING

## 2017-03-25 NOTE — PLAN OF CARE
Problem: Goal Outcome Summary  Goal: Goal Outcome Summary  PT 6A:  Pt continues to demo improved activity tolerance and (I) with functional mobility. Amb x600ft total with FWW, mod I.  Up/down x6 steps mod I using L rail.  Pt continues to have difficulty with sit<>stands, requiring CGA-Harriet without FWW but with FWW is SBA.  Pt agree to use FWW in the home and community at d/c.     Rec d/c home with  HHPT to progress functional strength and (I) with transfers.

## 2017-03-25 NOTE — PLAN OF CARE
Problem: Goal Outcome Summary  Goal: Goal Outcome Summary  Outcome: Improving  POD 2 L4-5 decompression. AOX4, VSS. Off capnography. Neuros intact. Mobility limited by pain. Up w A1, GB and walker. Voiding spont. No BM, cont bowel regimen. Tolerating reg diet. PIV SL'ed. Pain moderately controlled w Morphine PO/IV, Gabapentin, Lyrica and Lidocaine patches. Incisions JOSE. C/t monitor.

## 2017-03-25 NOTE — PLAN OF CARE
Problem: Goal Outcome Summary  Goal: Goal Outcome Summary  6A OT: Pt requires SBA-CGA with minvc for technique for sit to stand transfers from bed, toilet, and chair throughout session. SBA for functional mobility with FWW in room. Pt completed shower and full body dressing with Crystal. Pt maintained precautions throughout ADL training following intial demonstration.  Limited by weakness, balance deficits, and pain.      D/C: To home with family assist for heavy ADLs and bathing.

## 2017-03-25 NOTE — PROGRESS NOTES
Care Coordinator- Discharge Planning     Admission Date/Time:  3/22/2017  Attending MD:  Paco Salmeron MD     Data  Date of initial CC assessment:  3-23-17  Chart reviewed, discussed with interdisciplinary team.   Patient was admitted for:   1. S/P spinal fusion    2. Spondylolisthesis of lumbar region    3. Spinal stenosis of lumbar region with radiculopathy         Assessment  Phone call received from medical team requesting set up for home PT. Writer called pt's room and introduced self and role. Pt prefers home PT vs outpatient secondary to recent surgery and inability to get to OP facility due to transportation issues. Will have RN visit set up as well.    Coordination of Care and Referrals: Provided patient/family with options for home physical therapy and pt agreeable with [facility]. Referral made.       Plan  Anticipated Discharge Date:  3-25-17  Anticipated Discharge Plan:  To home with [facility] RN and PT    CTS Handoff completed:  YES    Kelli Cai RN

## 2017-03-25 NOTE — PLAN OF CARE
Problem: Individualization  Goal: Patient Preferences  Outcome: Adequate for Discharge Date Met:  03/25/17  VSS. Back pain controlled with po morphine and scheduled medications. Neuros intact. Back incisions JOSE. Good po intake, sat in chair for meals. PIV removed. Up with sba and gb. Voiding spontaneously, passing gas but no bm since surgery. Worked with PT and OT, had shower. Plan is to DC home this afternoon, waiting for wife to come (would like to do paperwork with her here).     1520- pt discharged at this time. PAperwork went over with pt and wife, questions answered at that time.

## 2017-03-25 NOTE — PLAN OF CARE
Problem: Goal Outcome Summary  Goal: Goal Outcome Summary  Outcome: No Change  POD#3 L4-L5 decompression. A&Ox4. VSS. Neuros intact. Pt is up with A1, GB, and walker. Pt slept most of night. Reported pain and received PO Morphine x1 with good relief. PIV SL. On regular diet. Lido patches in place. Voiding spontaneously via bathroom, no BM. Uses call light appropriately. Will continue to monitor and follow POC.

## 2017-03-26 NOTE — PLAN OF CARE
Problem: Goal Outcome Summary  Goal: Goal Outcome Summary  Physical Therapy Discharge Summary     Reason for therapy discharge:    Discharged to home with home therapy.     Progress towards therapy goal(s). See goals on Care Plan in [facility] electronic health record for goal details.  Goals partially met.  Barriers to achieving goals:   discharge from facility.     Therapy recommendation(s):    Continued therapy is recommended.  Rationale/Recommendations:  HHPT to progress (I) with transfers and gait.

## 2017-03-26 NOTE — PLAN OF CARE
Problem: Goal Outcome Summary  Goal: Goal Outcome Summary  Occupational Therapy Discharge Summary     Reason for therapy discharge:    Discharged to home with home therapy.     Progress towards therapy goal(s). See goals on Care Plan in [facility] electronic health record for goal details.  Goals partially met.  Barriers to achieving goals:   discharge from facility.     Therapy recommendation(s):    Continued therapy is recommended.  Rationale/Recommendations:  Precautions, limited tolerance for activity..

## 2017-03-27 ENCOUNTER — TELEPHONE (OUTPATIENT)
Dept: NEUROSURGERY | Facility: CLINIC | Age: 62
End: 2017-03-27

## 2017-03-27 ENCOUNTER — CARE COORDINATION (OUTPATIENT)
Dept: CARDIOLOGY | Facility: CLINIC | Age: 62
End: 2017-03-27

## 2017-03-27 NOTE — PROGRESS NOTES
"[facility]  \"Hello, my name is Domitila Martin , and I am calling from the [facility].  I want to check in and see how you are doing, after leaving the hospital.  You may also receive a call from your Care Coordinator (care team), but I want to make sure you don t have any urgent needs.  I have a couple questions to review with you:     Post-Discharge Outreach                                                    Jean-Calude Lowry is a 62 year old male     Follow-up Appointments           Follow Up and recommended labs and tests       Follow up with Ramona Mayer PA-C in 2 weeks for wound check     Follow up with Dr Salmeron in 6 weeks with xrays prior to visit                 Care Team:    Patient Care Team       Relationship Specialty Notifications Start End    Quang Mejia MD PCP - General Internal Medicine  1/27/15     Phone: [phone] Fax: [phone]         [facility] [address] [address]    Hung Olvera MD MD Orthopaedic Surgery  3/2/15     Phone: [phone] Fax: [phone]          [facility] [address] [facility] [address]    Stewart Mendez MD Referring Physician Physical Medicine and Rehab  4/10/15     Comment:  referring to Dr. Olvera    Phone: [phone] Fax: [phone]         [facility] [address] [facility] [address]    Alden Gibbons MD Referring Physician Internal Medicine  5/5/15     Comment:  referring to 2A Hepatology    Phone: [phone] Fax: [phone]          [facility] [address]  [facility] [address]    Ajay Schulz, RN Nurse Coordinator Neurosurgery Admissions 2/7/17     Comment:  Neurosurgery Spine  FAX: [phone]    Phone: [phone] Pager: [phone]                Transition of Care Review                                                      Patient was called three times and no answer so post 24 hr DC follow up calls will be closed " out                         Plan                                                      Thanks for your time.  Your Care Coordinator may follow-up within the next couple days.  In the meantime if you have questions, concerns or problems call your care team.        Domitila Martin

## 2017-03-27 NOTE — TELEPHONE ENCOUNTER
I called the patient and he did not answer. We are not usually in a position to fill pain scripts over the phone after hours. I will recommend the patient contact the office during the day or call his PCP. I will certainly be available to assess the situation in its entirety if I receive a follow up call. Thank you.

## 2017-03-27 NOTE — TELEPHONE ENCOUNTER
Neurosurgery Discharge Coordination Note       Responsible Attending physician: Paco Salmeron MD    Operation performed: Lumbar 4-5 decompression, bilateral transforamenal lumbar interbody fusion, pedicle screw instrumentation    Date of Discharge: 3/24/17    Discharge to: Home     Current status: Patient states he feels ok.  Denies redness, swelling,increased tenderness or elevated temp. Reports Incision CDI without signs of infection.   Denies current bowel or bladder issues.    He calls requesting more pain meds.      Discharge instructions and medications reviewed with patient.  Follow up appointments/imaging/testes needed:     RN triage/on call number given: [phone]/ [phone]

## 2017-03-29 ENCOUNTER — TELEPHONE (OUTPATIENT)
Dept: NEUROSURGERY | Facility: CLINIC | Age: 62
End: 2017-03-29

## 2017-03-29 NOTE — PATIENT INSTRUCTIONS
Dr Salmeron has ok'd for Jean-Claude to increase his morphine elixir to 10 mg Q3H (previously 5 mg Q3H).  I made phone contact with Jean-Claude and gave him these instructions.  I also instructed him to call today for an appt with his pain management as soon as possible, to facilitate transition back to pain management. He is agreeable, and has my contact information.

## 2017-04-01 ENCOUNTER — TELEPHONE (OUTPATIENT)
Dept: NEUROSURGERY | Facility: CLINIC | Age: 62
End: 2017-04-01

## 2017-04-02 NOTE — TELEPHONE ENCOUNTER
There was a request for pain medications on the patient's behalf but they had left by the time I was able to attempt to speak with them. Mr. Lowry should follow up closely with his pain medicine physicians.

## 2017-04-03 ENCOUNTER — TELEPHONE (OUTPATIENT)
Dept: NEUROSURGERY | Facility: CLINIC | Age: 62
End: 2017-04-03

## 2017-04-03 DIAGNOSIS — M48.061 SPINAL STENOSIS, LUMBAR REGION, WITHOUT NEUROGENIC CLAUDICATION: Primary | ICD-10-CM

## 2017-04-03 DIAGNOSIS — M43.06 LUMBAR SPONDYLOLYSIS: Primary | ICD-10-CM

## 2017-04-03 RX ORDER — METHOCARBAMOL 500 MG/1
500 TABLET, FILM COATED ORAL 4 TIMES DAILY PRN
Qty: 30 TABLET | Refills: 0 | Status: SHIPPED | OUTPATIENT
Start: 2017-04-03

## 2017-04-03 RX ORDER — OXYCODONE HYDROCHLORIDE 5 MG/1
5-10 TABLET ORAL EVERY 4 HOURS PRN
Qty: 32 TABLET | Refills: 0 | Status: SHIPPED | OUTPATIENT
Start: 2017-04-03 | End: 2017-04-18

## 2017-04-03 RX ORDER — OXYCODONE HYDROCHLORIDE 5 MG/1
5-10 TABLET ORAL
Qty: 32 TABLET | Refills: 0 | Status: SHIPPED | OUTPATIENT
Start: 2017-04-03 | End: 2017-04-06

## 2017-04-03 RX ORDER — METHOCARBAMOL 500 MG/1
500 TABLET, FILM COATED ORAL 4 TIMES DAILY PRN
Qty: 16 TABLET | Refills: 0 | Status: SHIPPED | OUTPATIENT
Start: 2017-04-03

## 2017-04-03 NOTE — TELEPHONE ENCOUNTER
PC to pt in response to in-basket message requesting call back in regards to request for pain meds.  Pt reports he is out of meds and requests an urgent refill.  No answer at given number.  Left VM with contact info and instructions to return call at patient's convenience.  Last staff entries indicate pt should be transitioning to local pain management provider.

## 2017-04-03 NOTE — TELEPHONE ENCOUNTER
Pt called X1 and wife called X1 today:  04/03/17 1:01pm  Needs 2 refills please will  hard copy-pt completely out since Sat AM  Best time to return call:needs asap-doesn't go to a pn clinic. Has no pain meds on board at this time. Wants pain management.    Message left?Pain is unchanged since surgery the pain meds aren't working. Said he needs the real morphine. Pt is in a lot of pain and tenseness 10/10  Pt shouting and hung up phone due to frustration.    Physical therapy called back now:  I let them know he needs to make appt with pain clinic Dr and that all the Neurosurgeons  are out of town this week. I gave the pt and Physical Therapist the Neurosurg on call resident phone number.  To fill script at [facility].  morphine 10 MG/5ML solution 500 mL 0 3/24/2017  No  Sig: Take 3.75-7.5 mLs (7.5-15 mg) by mouth every 3 hours as needed for moderate to severe pain  Class: Local Print  Route: Oral  Order: [id number]  Printout Tracking   External Result Report      Takes 2 tabs q6h ever since last Fri   methocarbamol (ROBAXIN) 500 MG tablet 60 tablet 0 3/24/2017  No  Sig: Take 1 tablet (500 mg) by mouth every 6 hours as needed for muscle spasms  Class: E-Prescribe

## 2017-04-05 NOTE — PROGRESS NOTES
Neurosurgery clinic post op wound check  Date of visit: 4/6/2017    Procedure:     DATE OF PROCEDURE: 03/22/2017  Dr Salmeron  DIAGNOSIS: L4-L5 lumbar spondylolisthesis with stenosis and radiculopathy.   PROCEDURES PERFORMED:   1. Bilateral L4-5 decompression  2. Bilateral L4-L5 transforaminal lumbar interbody fusion.   3. Placement of L4-L5 pedicle screw fixation. [employer]    INDICATIONS FOR PROCEDURE: Mr. Jean-Claude Lowry is a 62-year-old gentleman who presented to the Neurosurgery Clinic at the [facility] with concerns of low back pain for about 1-1/2 years. It is designated in his lower back and radiated across his left buttock in L4 distribution. He had a prior history of bilateral L4-L5 laminectomy many years ago and subsequent L5-S1 fusion. Due to spondylolisthesis with stenosis and imaging findings, he was consented for decompression as well as fusion surgery, as mentioned above, after risks, benefits and alternatives for the procedure were explained to him in detail.   HPI:   He is now 2 weeks status post the above procedure. Following surgery the patient endorsed incisional pain, leg pain had improved. The patient does have a history of chronic pain issues however a regimen of medication was found to make his symptoms tolerable. The patient was evaluated by therapies who felt he was safe to discharge home with family. Prior to discharge the patient was voiding, passing bowel movements, medically stable and tolerating diet.      Since discharge he has made several calls to the team needing more pain medication. He reports he has no pain management doctor, he has a doctor managing his suboxone, his psychiatrist Dr Alcantar does it since he does have an addiction history.  He is oppositional with this examiner. When I read to him from the chart what his inpatient care or meds were he tells me is this information is incorrect, or that I am wrong, or the chart is wrong, or  that they wrote inaccurate things in the chart, or that they didn't tell the whole story. He stops short of accusing anyone of lying in the chart. He complains bitterly of being undertreated for his pain but cannot estimate how much more medication he might need, saying only that OxyContin used to be helpful but he hadn't been on that in decades. Ultimately I did confront this behavior, being clear all I wanted to do was help, and that I can't help him if he can't help me understand the problem  He asks repeatedly for more pain medication, and once I print the prescription,  his wife takes it immediately at that moment down to pharmacy to get filled before the appointment is over.      He presents for routine wound check and suture/staple removal.    Meds on discharge:  He alleges he only got the prescriptions for the MSIR and Robaxin.  He admits they never picked up any of the others because they don't help or he won't take them.  neurontin 600 tid  Lyrica  100 tid  Lidoderm patches 3 per day  Robaxin 500 qid  MSIR  10mg/ml   7.5/15 mg  Tylenol;  500mg  2 every 8 hours             Current Outpatient Prescriptions:      oxyCODONE (ROXICODONE) 5 MG IR tablet, 1 to 1.5 tablets every 3 hours as needed for pain, Disp: 150 tablet, Rfl: 0     methocarbamol (ROBAXIN) 500 MG tablet, Take 1 tablet (500 mg) by mouth every 6 hours as needed for muscle spasms, Disp: 60 tablet, Rfl: 3     methocarbamol (ROBAXIN) 500 MG tablet, Take 1 tablet (500 mg) by mouth 4 times daily as needed for muscle spasms, Disp: 16 tablet, Rfl: 0     methocarbamol (ROBAXIN) 500 MG tablet, Take 1 tablet (500 mg) by mouth 4 times daily as needed for muscle spasms, Disp: 30 tablet, Rfl: 0     oxyCODONE (ROXICODONE) 5 MG IR tablet, Take 1-2 tablets (5-10 mg) by mouth every 4 hours as needed for pain, Disp: 32 tablet, Rfl: 0     acetaminophen (TYLENOL) 500 MG tablet, Take 2 tablets (1,000 mg) by mouth every 8 hours, Disp: 90 tablet, Rfl: 0     morphine 10  "MG/5ML solution, Take 3.75-7.5 mLs (7.5-15 mg) by mouth every 3 hours as needed for moderate to severe pain, Disp: 500 mL, Rfl: 0     senna-docusate (SENOKOT-S;PERICOLACE) 8.6-50 MG per tablet, Take 3 tablets by mouth 2 times daily, Disp: 60 tablet, Rfl: 0     gabapentin (NEURONTIN) 600 MG tablet, Take 1 tablet (600 mg) by mouth 3 times daily 600 mg AM, 300 mg afternoon, 600 mg HS, Disp: 90 tablet, Rfl: 0     pregabalin (LYRICA) 100 MG capsule, Take 1 capsule (100 mg) by mouth 3 times daily, Disp: 90 capsule, Rfl: 0     ledipasvir-sofosbuvir (HARVONI)  MG per tablet, Take 1 tablet by mouth every morning, Disp: , Rfl:      RABEprazole Sodium (ACIPHEX PO), Take 20 mg by mouth every morning , Disp: , Rfl:      DULoxetine HCl (CYMBALTA PO), Take 60 mg by mouth At Bedtime , Disp: , Rfl:      TRAZODONE HCL PO, Take 150-300 mg by mouth At Bedtime, Disp: , Rfl:   No Known Allergies  PMH, SOC HIST, FAM HIST, PROBLEM LIST:  All reviewed in EPIC.    OBJECTIVE:  /67 (BP Location: Left arm, Patient Position: Chair, Cuff Size: Adult Large)  Pulse 89  Temp 98  F (36.7  C) (Oral)  Resp 20  Ht 1.626 m (5' 4.02\")  Wt 82.4 kg (181 lb 9.6 oz)  BMI 31.15 kg/m2    Imaging:  None new.    EXAM:  Well developed well nourished male found seated somewhat comfortably in exam chair.  No apparent distress. He is accompanied by female spouse.  A&O X3.  Mood and affect irritable. Language and fund of knowledge intact.  Is able to sit and rise independently.   He has a healing incision.  The left side as a small reddened area at the apex of the incision. No heat, swelling, or pus.    Assessment:  1. Chronic pain disorder    2. S/P spinal fusion    3. Spinal stenosis, lumbar region, without neurogenic claudication      Jean-Claude Pettitabhishekevans is doing OK after his lumbar decompression and fusion. He tells me today his pain is worse than preop in both the back and leg.  We plan to treat with appropriate increased doses of medications, I " would not consider putting him on long acting meds at this juncture. The wound is healthy.     PLAN:  We discussed wound care.  *  May shower and shampoo with mild soap/shampoo including the incision. No hair conditioners, hair treatments or skin cremes for two more weeks.  *  May swim or bathe when all scabbing is gone from the incision.  We discussed medication.    *  FYI: In general we prescribe narcotics for pain management in the post operative recovery phase generally 2-6 weeks post op, depending on the surgery performed.  Pre-op our patients are advised that by 6 weeks post op we anticipate they will no longer require narcotic. I reinforced that today.  In some circumstances we extend the treatment window to up to 12 weeks post-op with the understanding that after 6 weeks they have a choice to either:  Have us devise a weaning schedule, goal to be no narcotic by the 12 th week post op:  Or we make a referral to pain management or PCP to take over narcotic prescriptions.   For pain outside the scope of these parameters we refer the patient back to his/her other providers for ongoing narcotic needs.   .  *  Medications prescribed today:     Oxycodone 10 mg  May take 1 to 1.5 pills every 3 hours as needed (printed).   I refilled robaxin. (E-prescribed)   *  Continue to avoid NSAIDs until 3 months post op.  We discussed activities and return to work.  *  We recommend he continue the current activity restrictions until he returns for the next visit.  *   He can return to driving if: he is off narcotic.    We discussed follow up   *  All the patient's questions have been answered and they demonstrate good understanding of the above.    *  Return to clinic in 4 weeks.  Imaging for that visit: Plain XR  *   The patient has our contact information and is aware that he should call if he has questions comments or concerns.     We appreciate the opportunity to be of service in the care of this pleasant patient.  Please  do call if there is anything more we can do    Ramona Mayer PA-C  [facility]  Department of Neurosurgery  Phone: [phone]  Fax: [phone]

## 2017-04-06 ENCOUNTER — OFFICE VISIT (OUTPATIENT)
Dept: NEUROSURGERY | Facility: CLINIC | Age: 62
End: 2017-04-06

## 2017-04-06 VITALS
SYSTOLIC BLOOD PRESSURE: 115 MMHG | HEIGHT: 64 IN | WEIGHT: 181.6 LBS | RESPIRATION RATE: 20 BRPM | HEART RATE: 89 BPM | DIASTOLIC BLOOD PRESSURE: 67 MMHG | TEMPERATURE: 98 F | BODY MASS INDEX: 31 KG/M2

## 2017-04-06 DIAGNOSIS — G89.4 CHRONIC PAIN DISORDER: Primary | ICD-10-CM

## 2017-04-06 DIAGNOSIS — M48.061 SPINAL STENOSIS, LUMBAR REGION, WITHOUT NEUROGENIC CLAUDICATION: ICD-10-CM

## 2017-04-06 DIAGNOSIS — Z98.1 S/P SPINAL FUSION: ICD-10-CM

## 2017-04-06 DIAGNOSIS — M62.830 MUSCLE SPASM OF BACK: ICD-10-CM

## 2017-04-06 RX ORDER — METHOCARBAMOL 500 MG/1
500 TABLET, FILM COATED ORAL EVERY 6 HOURS PRN
Qty: 60 TABLET | Refills: 3 | Status: SHIPPED | OUTPATIENT
Start: 2017-04-06 | End: 2017-05-01

## 2017-04-06 RX ORDER — OXYCODONE HYDROCHLORIDE 5 MG/1
TABLET ORAL
Qty: 150 TABLET | Refills: 0 | Status: SHIPPED | OUTPATIENT
Start: 2017-04-06 | End: 2017-04-18

## 2017-04-06 ASSESSMENT — PAIN SCALES - GENERAL: PAINLEVEL: WORST PAIN (10)

## 2017-04-06 NOTE — LETTER
4/6/2017       RE: Jean-Claude Lowry  [address]  [facility] [address]     Dear Colleague,    Thank you for referring your patient, Jean-Claude Lowry, to the [facility] NEUROSURGERY at [facility]. Please see a copy of my visit note below.    Neurosurgery clinic post op wound check  Date of visit: 4/6/2017    Procedure:     DATE OF PROCEDURE: 03/22/2017  Dr Salmeron  DIAGNOSIS: L4-L5 lumbar spondylolisthesis with stenosis and radiculopathy.   PROCEDURES PERFORMED:   1. Bilateral L4-5 decompression  2. Bilateral L4-L5 transforaminal lumbar interbody fusion.   3. Placement of L4-L5 pedicle screw fixation. [employer]    INDICATIONS FOR PROCEDURE: Mr. Jean-Claude Lowry is a 62-year-old gentleman who presented to the Neurosurgery Clinic at the [facility] with concerns of low back pain for about 1-1/2 years. It is designated in his lower back and radiated across his left buttock in L4 distribution. He had a prior history of bilateral L4-L5 laminectomy many years ago and subsequent L5-S1 fusion. Due to spondylolisthesis with stenosis and imaging findings, he was consented for decompression as well as fusion surgery, as mentioned above, after risks, benefits and alternatives for the procedure were explained to him in detail.   HPI:   He is now 2 weeks status post the above procedure. Following surgery the patient endorsed incisional pain, leg pain had improved. The patient does have a history of chronic pain issues however a regimen of medication was found to make his symptoms tolerable. The patient was evaluated by therapies who felt he was safe to discharge home with family. Prior to discharge the patient was voiding, passing bowel movements, medically stable and tolerating diet.      Since discharge he has made several calls to the team needing more pain medication. He reports he has no pain management doctor, he has a doctor  managing his suboxone, his psychiatrist Dr Alcantar does it since he does have an addiction history.  He is oppositional with this examiner. When I read to him from the chart what his inpatient care or meds were he tells me is this information is incorrect, or that I am wrong, or the chart is wrong, or that they wrote inaccurate things in the chart, or that they didn't tell the whole story. He stops short of accusing anyone of lying in the chart. He complains bitterly of being undertreated for his pain but cannot estimate how much more medication he might need, saying only that OxyContin used to be helpful but he hadn't been on that in decades. Ultimately I did confront this behavior, being clear all I wanted to do was help, and that I can't help him if he can't help me understand the problem  He asks repeatedly for more pain medication, and once I print the prescription,  his wife takes it immediately at that moment down to pharmacy to get filled before the appointment is over.      He presents for routine wound check and suture/staple removal.    Meds on discharge:  He alleges he only got the prescriptions for the MSIR and Robaxin.  He admits they never picked up any of the others because they don't help or he won't take them.  neurontin 600 tid  Lyrica  100 tid  Lidoderm patches 3 per day  Robaxin 500 qid  MSIR  10mg/ml   7.5/15 mg  Tylenol;  500mg  2 every 8 hours             Current Outpatient Prescriptions:      oxyCODONE (ROXICODONE) 5 MG IR tablet, 1 to 1.5 tablets every 3 hours as needed for pain, Disp: 150 tablet, Rfl: 0     methocarbamol (ROBAXIN) 500 MG tablet, Take 1 tablet (500 mg) by mouth every 6 hours as needed for muscle spasms, Disp: 60 tablet, Rfl: 3     methocarbamol (ROBAXIN) 500 MG tablet, Take 1 tablet (500 mg) by mouth 4 times daily as needed for muscle spasms, Disp: 16 tablet, Rfl: 0     methocarbamol (ROBAXIN) 500 MG tablet, Take 1 tablet (500 mg) by mouth 4 times daily as needed for  "muscle spasms, Disp: 30 tablet, Rfl: 0     oxyCODONE (ROXICODONE) 5 MG IR tablet, Take 1-2 tablets (5-10 mg) by mouth every 4 hours as needed for pain, Disp: 32 tablet, Rfl: 0     acetaminophen (TYLENOL) 500 MG tablet, Take 2 tablets (1,000 mg) by mouth every 8 hours, Disp: 90 tablet, Rfl: 0     morphine 10 MG/5ML solution, Take 3.75-7.5 mLs (7.5-15 mg) by mouth every 3 hours as needed for moderate to severe pain, Disp: 500 mL, Rfl: 0     senna-docusate (SENOKOT-S;PERICOLACE) 8.6-50 MG per tablet, Take 3 tablets by mouth 2 times daily, Disp: 60 tablet, Rfl: 0     gabapentin (NEURONTIN) 600 MG tablet, Take 1 tablet (600 mg) by mouth 3 times daily 600 mg AM, 300 mg afternoon, 600 mg HS, Disp: 90 tablet, Rfl: 0     pregabalin (LYRICA) 100 MG capsule, Take 1 capsule (100 mg) by mouth 3 times daily, Disp: 90 capsule, Rfl: 0     ledipasvir-sofosbuvir (HARVONI)  MG per tablet, Take 1 tablet by mouth every morning, Disp: , Rfl:      RABEprazole Sodium (ACIPHEX PO), Take 20 mg by mouth every morning , Disp: , Rfl:      DULoxetine HCl (CYMBALTA PO), Take 60 mg by mouth At Bedtime , Disp: , Rfl:      TRAZODONE HCL PO, Take 150-300 mg by mouth At Bedtime, Disp: , Rfl:   No Known Allergies  PMH, SOC HIST, FAM HIST, PROBLEM LIST:  All reviewed in EPIC.    OBJECTIVE:  /67 (BP Location: Left arm, Patient Position: Chair, Cuff Size: Adult Large)  Pulse 89  Temp 98  F (36.7  C) (Oral)  Resp 20  Ht 1.626 m (5' 4.02\")  Wt 82.4 kg (181 lb 9.6 oz)  BMI 31.15 kg/m2    Imaging:  None new.    EXAM:  Well developed well nourished male found seated somewhat comfortably in exam chair.  No apparent distress. He is accompanied by female spouse.  A&O X3.  Mood and affect irritable. Language and fund of knowledge intact.  Is able to sit and rise independently.   He has a healing incision.  The left side as a small reddened area at the apex of the incision. No heat, swelling, or pus.    Assessment:  1. Chronic pain disorder    2. " S/P spinal fusion    3. Spinal stenosis, lumbar region, without neurogenic claudication      Jean-Claude Lowry is doing OK after his lumbar decompression and fusion. He tells me today his pain is worse than preop in both the back and leg.  We plan to treat with appropriate increased doses of medications, I would not consider putting him on long acting meds at this juncture. The wound is healthy.     PLAN:  We discussed wound care.  *  May shower and shampoo with mild soap/shampoo including the incision. No hair conditioners, hair treatments or skin cremes for two more weeks.  *  May swim or bathe when all scabbing is gone from the incision.  We discussed medication.    *  FYI: In general we prescribe narcotics for pain management in the post operative recovery phase generally 2-6 weeks post op, depending on the surgery performed.  Pre-op our patients are advised that by 6 weeks post op we anticipate they will no longer require narcotic. I reinforced that today.  In some circumstances we extend the treatment window to up to 12 weeks post-op with the understanding that after 6 weeks they have a choice to either:  Have us devise a weaning schedule, goal to be no narcotic by the 12 th week post op:  Or we make a referral to pain management or PCP to take over narcotic prescriptions.   For pain outside the scope of these parameters we refer the patient back to his/her other providers for ongoing narcotic needs.   .  *  Medications prescribed today:     Oxycodone 10 mg  May take 1 to 1.5 pills every 3 hours as needed (printed).   I refilled robaxin. (E-prescribed)   *  Continue to avoid NSAIDs until 3 months post op.  We discussed activities and return to work.  *  We recommend he continue the current activity restrictions until he returns for the next visit.  *   He can return to driving if: he is off narcotic.    We discussed follow up   *  All the patient's questions have been answered and they demonstrate good  understanding of the above.    *  Return to clinic in 4 weeks.  Imaging for that visit: Plain XR  *   The patient has our contact information and is aware that he should call if he has questions comments or concerns.     We appreciate the opportunity to be of service in the care of this pleasant patient.  Please do call if there is anything more we can do    Ramona Mayer PA-C  [facility]  Department of Neurosurgery  Phone: [phone]  Fax: [phone]

## 2017-04-06 NOTE — NURSING NOTE
Chief Complaint   Patient presents with     RECHECK     [facility] RETURN - POST/OP     Evelia Campa MA

## 2017-04-06 NOTE — MR AVS SNAPSHOT
After Visit Summary   4/6/2017    Jean-Claude Lowry    MRN: [id number]           Patient Information     Date Of Birth          1955        Visit Information        Provider Department      4/6/2017 1:30 PM Ramona Mayer PA-C [facility] Neurosurgery        Today's Diagnoses     S/P spinal fusion        Spinal stenosis, lumbar region, without neurogenic claudication           Follow-ups after your visit        Your next 10 appointments already scheduled     May 05, 2017 11:00 AM CDT   (Arrive by 10:45 AM)   XR LUMBAR SPINE 2/3 VIEWS with [facility]   [facility] ([facility])    [address] [id number] [phone]           Please bring a list of your current medicines to your exam. (Include vitamins, minerals and over-thecounter medicines.) Leave your valuables at home.  Tell your doctor if there is a chance you may be pregnant.  You do not need to do anything special for this exam.            May 05, 2017 11:30 AM CDT   (Arrive by 11:15 AM)   Return Visit with DOMONIQUE Navarrete [facility] Neurosurgery ([facility])    [address] [address] [phone]              Future tests that were ordered for you today     Open Future Orders        Priority Expected Expires Ordered    XR Lumbar Spine 2-3 Views* Routine 5/4/2017 4/6/2018 4/6/2017            Who to contact     Please call your clinic at [phone] to:    Ask questions about your health    Make or cancel appointments    Discuss your medicines    Learn about your test results    Speak to your doctor   If you have compliments or concerns about an experience at your clinic, or if you wish to file a complaint, please contact [facility] Patient Relations at [phone] or email us at [email or web address]         Additional Information About Your Visit        Lyla  "Information     yourdelivery is an electronic gateway that provides easy, online access to your medical records. With yourdelivery, you can request a clinic appointment, read your test results, renew a prescription or communicate with your care team.     To sign up for yourdelivery visit the website at [email or web address]   You will be asked to enter the access code listed below, as well as some personal information. Please follow the directions to create your username and password.     Your access code is: [email or web address]  Expires: 2017  7:30 AM     Your access code will  in 90 days. If you need help or a new code, please contact your [facility] or call [phone] for assistance.        Care EveryWhere ID     This is your Care EveryWhere ID. This could be used by other organizations to access your [facility] records  [id number]        Your Vitals Were     Pulse Temperature Respirations Height BMI (Body Mass Index)       89 98  F (36.7  C) (Oral) 20 1.626 m (5' 4.02\") 31.15 kg/m2        Blood Pressure from Last 3 Encounters:   17 115/67   17 115/58   03/15/17 119/72    Weight from Last 3 Encounters:   17 82.4 kg (181 lb 9.6 oz)   17 81.3 kg (179 lb 3.7 oz)   03/15/17 84.2 kg (185 lb 11.2 oz)                 Today's Medication Changes          These changes are accurate as of: 17  2:51 PM.  If you have any questions, ask your nurse or doctor.               These medicines have changed or have updated prescriptions.        Dose/Directions    * oxyCODONE 5 MG IR tablet   Commonly known as:  ROXICODONE   This may have changed:  Another medication with the same name was changed. Make sure you understand how and when to take each.   Used for:  Lumbar spondylolysis   Changed by:  Alden De Los Santos MD        Dose:  5-10 mg   Take 1-2 tablets (5-10 mg) by mouth every 4 hours as needed for pain   Quantity:  32 tablet   Refills:  0       * oxyCODONE 5 MG IR " tablet   Commonly known as:  ROXICODONE   This may have changed:    - how much to take  - how to take this  - when to take this  - reasons to take this  - additional instructions   Used for:  Spinal stenosis, lumbar region, without neurogenic claudication   Changed by:  Ramona Mayer PA-C        1 to 1.5 tablets every 3 hours as needed for pain   Quantity:  150 tablet   Refills:  0       * Notice:  This list has 2 medication(s) that are the same as other medications prescribed for you. Read the directions carefully, and ask your doctor or other care provider to review them with you.         Where to get your medicines      These medications were sent to [facility] [address] [place] [address] - [address]  [address], [facility] [address]     Phone:  [phone]     methocarbamol 500 MG tablet         Some of these will need a paper prescription and others can be bought over the counter.  Ask your nurse if you have questions.     Bring a paper prescription for each of these medications     oxyCODONE 5 MG IR tablet                Primary Care Provider Office Phone # Fax #    Quang Mejia [phone]       [facility] [address] [facility] [address]        Thank you!     Thank you for choosing [facility] NEUROSURGERY  for your care. Our goal is always to provide you with excellent care. Hearing back from our patients is one way we can continue to improve our services. Please take a few minutes to complete the written survey that you may receive in the mail after your visit with us. Thank you!             Your Updated Medication List - Protect others around you: Learn how to safely use, store and throw away your medicines at www.disposemymeds.org.          This list is accurate as of: 4/6/17  2:51 PM.  Always use your most recent med list.                   Brand Name Dispense Instructions for use    acetaminophen 500 MG tablet    TYLENOL     90 tablet    Take 2 tablets (1,000 mg) by mouth every 8 hours       ACIPHEX PO      Take 20 mg by mouth every morning       CYMBALTA PO      Take 60 mg by mouth At Bedtime       gabapentin 600 MG tablet    NEURONTIN    90 tablet    Take 1 tablet (600 mg) by mouth 3 times daily 600 mg AM, 300 mg afternoon, 600 mg HS       ledipasvir-sofosbuvir  MG per tablet    HARVONI     Take 1 tablet by mouth every morning       * methocarbamol 500 MG tablet    ROBAXIN    16 tablet    Take 1 tablet (500 mg) by mouth 4 times daily as needed for muscle spasms       * methocarbamol 500 MG tablet    ROBAXIN    30 tablet    Take 1 tablet (500 mg) by mouth 4 times daily as needed for muscle spasms       * methocarbamol 500 MG tablet    ROBAXIN    60 tablet    Take 1 tablet (500 mg) by mouth every 6 hours as needed for muscle spasms       morphine 10 MG/5ML solution     500 mL    Take 3.75-7.5 mLs (7.5-15 mg) by mouth every 3 hours as needed for moderate to severe pain       * oxyCODONE 5 MG IR tablet    ROXICODONE    32 tablet    Take 1-2 tablets (5-10 mg) by mouth every 4 hours as needed for pain       * oxyCODONE 5 MG IR tablet    ROXICODONE    150 tablet    1 to 1.5 tablets every 3 hours as needed for pain       pregabalin 100 MG capsule    LYRICA    90 capsule    Take 1 capsule (100 mg) by mouth 3 times daily       senna-docusate 8.6-50 MG per tablet    SENOKOT-S;PERICOLACE    60 tablet    Take 3 tablets by mouth 2 times daily       TRAZODONE HCL PO      Take 150-300 mg by mouth At Bedtime       * Notice:  This list has 5 medication(s) that are the same as other medications prescribed for you. Read the directions carefully, and ask your doctor or other care provider to review them with you.

## 2017-04-07 ENCOUNTER — TELEPHONE (OUTPATIENT)
Dept: NEUROSURGERY | Facility: CLINIC | Age: 62
End: 2017-04-07

## 2017-04-13 ENCOUNTER — TELEPHONE (OUTPATIENT)
Dept: NEUROSURGERY | Facility: CLINIC | Age: 62
End: 2017-04-13

## 2017-04-13 NOTE — TELEPHONE ENCOUNTER
[facility] Prior Authorization Team   Phone: [phone]  Fax: [phone]    PA Initiation    Medication: METHOCARBAMOL 500MG  Insurance Company: [employer] ([facility]) - Phone [phone] Fax [phone]  Pharmacy Filling the Rx: [facility] [address] [place], [place] - [address]  Filling Pharmacy Phone: [phone]  Filling Pharmacy Fax:    Start Date: 4/13/2017

## 2017-04-13 NOTE — TELEPHONE ENCOUNTER
Phone call to patient in response to in[employer] message requesting narcotic refill.  Last rx by SIXTO Mayer on 4/6/2017,     Oxycodone 5 mg tabs, May take 1-1.5 tabs every 3 hours prn for pain.  Qty: 150, Refills: 0.  Pt reports taking 3-4 tabs every 3 hours, has run out of meds.      Checked usage against [place] PMPP database. Confirms fill of [facility] on 4/6/2017.    Refill request denied.  Informed pt Rx cannot be refilled until 4/18/2017.  Pt very unhappy with outcome.  Became loud and abusive towards caller and care team over telephone.  Politely ended call at that time.

## 2017-04-18 ENCOUNTER — DOCUMENTATION ONLY (OUTPATIENT)
Dept: NEUROSURGERY | Facility: CLINIC | Age: 62
End: 2017-04-18

## 2017-04-18 DIAGNOSIS — M48.061 SPINAL STENOSIS, LUMBAR REGION, WITHOUT NEUROGENIC CLAUDICATION: ICD-10-CM

## 2017-04-18 RX ORDER — OXYCODONE HYDROCHLORIDE 5 MG/1
TABLET ORAL
Qty: 100 TABLET | Refills: 0 | Status: SHIPPED | OUTPATIENT
Start: 2017-04-18

## 2017-04-18 NOTE — TELEPHONE ENCOUNTER
PRIOR AUTHORIZATION DENIED    Medication: METHOCARBAMOL 500MG - Denied    Denial Date: 4/14/2017    Denial Rational: Methocarbamol is not supported by the FDA for treating your medical condition(s): Arthrodesis status and chronic pain syndrome and spinal stenosis, lumbar region and cervicalgia and other chronic pain. If you feel there is additional information related to this case that might affect this decision and an appeal is desired, please submit a written letter of medical necessity to our PA Team.              Appeal Information:

## 2017-05-01 DIAGNOSIS — Z98.1 S/P SPINAL FUSION: ICD-10-CM

## 2017-05-01 DIAGNOSIS — M48.061 SPINAL STENOSIS, LUMBAR REGION, WITHOUT NEUROGENIC CLAUDICATION: ICD-10-CM

## 2017-05-01 RX ORDER — METHOCARBAMOL 500 MG/1
500 TABLET, FILM COATED ORAL EVERY 6 HOURS PRN
Qty: 60 TABLET | Refills: 3 | Status: SHIPPED | OUTPATIENT
Start: 2017-05-01

## 2017-05-05 ENCOUNTER — OFFICE VISIT (OUTPATIENT)
Dept: NEUROSURGERY | Facility: CLINIC | Age: 62
End: 2017-05-05

## 2017-05-05 VITALS
HEIGHT: 64 IN | WEIGHT: 181.66 LBS | DIASTOLIC BLOOD PRESSURE: 78 MMHG | HEART RATE: 75 BPM | TEMPERATURE: 98.1 F | BODY MASS INDEX: 31.01 KG/M2 | SYSTOLIC BLOOD PRESSURE: 128 MMHG | RESPIRATION RATE: 18 BRPM | OXYGEN SATURATION: 96 %

## 2017-05-05 DIAGNOSIS — Z98.890 POST-OPERATIVE STATE: ICD-10-CM

## 2017-05-05 DIAGNOSIS — Z98.1 S/P SPINAL FUSION: ICD-10-CM

## 2017-05-05 DIAGNOSIS — G89.18 ACUTE POST-OPERATIVE PAIN: Primary | ICD-10-CM

## 2017-05-05 DIAGNOSIS — M48.061 SPINAL STENOSIS OF LUMBAR REGION WITH RADICULOPATHY: ICD-10-CM

## 2017-05-05 DIAGNOSIS — M54.16 SPINAL STENOSIS OF LUMBAR REGION WITH RADICULOPATHY: ICD-10-CM

## 2017-05-05 RX ORDER — OXYCODONE HYDROCHLORIDE 5 MG/1
5 TABLET ORAL EVERY 8 HOURS PRN
Qty: 45 TABLET | Refills: 0 | Status: SHIPPED | OUTPATIENT
Start: 2017-05-05 | End: 2017-05-20

## 2017-05-05 ASSESSMENT — PAIN SCALES - GENERAL: PAINLEVEL: SEVERE PAIN (6)

## 2017-05-05 NOTE — LETTER
5/5/2017     RE: Jean-Claude Lowry  [address]  [address]  [facility] [address]     Dear Colleague,    Thank you for referring your patient, Jean-Claude Lowry, to the [facility] NEUROSURGERY at [facility]. Please see a copy of my visit note below.    Neurosurgery clinic post op wound check  Date of visit:  5/5/2017         DATE OF PROCEDURE: 03/22/2017  Dr Salmeron  DIAGNOSIS: L4-L5 lumbar spondylolisthesis with stenosis and radiculopathy.   PROCEDURES PERFORMED:   1. Bilateral L4-5 decompression  2. Bilateral L4-L5 transforaminal lumbar interbody fusion.   3. Placement of L4-L5 pedicle screw fixation. [employer]    INDICATIONS FOR PROCEDURE: Mr. Jean-Claude Lowry is a 62-year-old gentleman who presented to the Neurosurgery Clinic at the [facility] with concerns of low back pain for about 1-1/2 years. It is designated in his lower back and radiated across his left buttock in L4 distribution. He had a prior history of bilateral L4-L5 laminectomy many years ago and subsequent L5-S1 fusion. Due to spondylolisthesis with stenosis and imaging findings, he was consented for decompression as well as fusion surgery, as mentioned above, after risks, benefits and alternatives for the procedure were explained to him in detail.   HPI:   He is now 6 weeks status post the above procedure. Following surgery the patient endorsed incisional pain, leg pain had improved. The patient does have a history of chronic pain issues however a regimen of medication was found to make his symptoms tolerable. The patient was evaluated by therapies who felt he was safe to discharge home with family. Prior to discharge the patient was voiding, passing bowel movements, medically stable and tolerating diet.      Since discharge initially he has made several calls to the team needing more pain medication. He reported he has no pain management doctor, he has a doctor  managing his suboxone, his psychiatrist Dr Alcantar does it since he does have an addiction history.   He is much improved today, his last pain med prescription lasted the appropriate amount of time and he feels much better. He's walking better and is happy with the results thus far.   He called Dominic yesterday about meds and Dominic gave him one more prescription for meds max 3 per day, after this he must see his PCP or other and he is aware of that and accepting.   He presents for routine 6 week visit.    Current Outpatient Prescriptions:      oxyCODONE (ROXICODONE) 5 MG IR tablet, Take 1 tablet (5 mg) by mouth every 8 hours as needed for pain (MAX of 3 tabs/day) Last Rx from this office.  Additional refills must be provided by Primary Care Provider or Pain Management Physician., Disp: 45 tablet, Rfl: 0     methocarbamol (ROBAXIN) 500 MG tablet, Take 1 tablet (500 mg) by mouth every 6 hours as needed for muscle spasms, Disp: 60 tablet, Rfl: 3     oxyCODONE (ROXICODONE) 5 MG IR tablet, 1 tablet every 4-6 hours as needed for pain.  Max of 6 tablets per day.  Must last until 5 may 2017.  No further refills without clinic appointment., Disp: 100 tablet, Rfl: 0     methocarbamol (ROBAXIN) 500 MG tablet, Take 1 tablet (500 mg) by mouth 4 times daily as needed for muscle spasms, Disp: 16 tablet, Rfl: 0     methocarbamol (ROBAXIN) 500 MG tablet, Take 1 tablet (500 mg) by mouth 4 times daily as needed for muscle spasms, Disp: 30 tablet, Rfl: 0     acetaminophen (TYLENOL) 500 MG tablet, Take 2 tablets (1,000 mg) by mouth every 8 hours, Disp: 90 tablet, Rfl: 0     morphine 10 MG/5ML solution, Take 3.75-7.5 mLs (7.5-15 mg) by mouth every 3 hours as needed for moderate to severe pain, Disp: 500 mL, Rfl: 0     senna-docusate (SENOKOT-S;PERICOLACE) 8.6-50 MG per tablet, Take 3 tablets by mouth 2 times daily, Disp: 60 tablet, Rfl: 0     gabapentin (NEURONTIN) 600 MG tablet, Take 1 tablet (600 mg) by mouth 3 times daily 600 mg AM,  "300 mg afternoon, 600 mg HS, Disp: 90 tablet, Rfl: 0     pregabalin (LYRICA) 100 MG capsule, Take 1 capsule (100 mg) by mouth 3 times daily, Disp: 90 capsule, Rfl: 0     ledipasvir-sofosbuvir (HARVONI)  MG per tablet, Take 1 tablet by mouth every morning, Disp: , Rfl:      RABEprazole Sodium (ACIPHEX PO), Take 20 mg by mouth every morning , Disp: , Rfl:      DULoxetine HCl (CYMBALTA PO), Take 60 mg by mouth At Bedtime , Disp: , Rfl:      TRAZODONE HCL PO, Take 150-300 mg by mouth At Bedtime, Disp: , Rfl:   No Known Allergies  PMH, SOC HIST, FAM HIST, PROBLEM LIST:  All reviewed in EPIC.    OBJECTIVE:  /78 (BP Location: Left arm, Patient Position: Chair, Cuff Size: Adult Regular)  Pulse 75  Temp 98.1  F (36.7  C) (Oral)  Resp 18  Ht 1.626 m (5' 4.02\")  Wt 82.4 kg (181 lb 10.5 oz)  SpO2 96%  BMI 31.16 kg/m2    Imaging:   These are the pertinent findings from:   Xrays taken today.   Overall alignment of the spine in 2 planes is satisfactory and unchanged from post op. Hardware is in good position without evidence failure.  Engraftment is not yet seen.  Please see Epic for the bulk of the report.  I personally reviewed the images with the patient    EXAM:  Well developed well nourished male found seated somewhat comfortably in exam chair.  No apparent distress. He is unaccompanied.  A&O X3.  Mood and affect benign. Language and fund of knowledge intact.  Is able to sit and rise independently.   He has a healed incision.      Assessment:  1. Acute post-operative pain    2. S/P spinal fusion    3. Spinal stenosis of lumbar region with radiculopathy    4. Post-operative state      Jean-Claude Lowry is doing much better now after his lumbar decompression and fusion.      PLAN:  We discussed wound care.  *  May swim or bathe now as desired.  We discussed medication.    *  FYI: In general we reserve narcotics for pain management in the post operative recovery phase generally 2-6 weeks post op, depending on " the surgery performed.  Pre-op our patients are advised that by 6 weeks post op we anticipate they will no longer require narcotic. I reinforced that today.  For pain outside the scope of these parameters we refer the patient back to his/her other providers for ongoing narcotic needs.   *  Medications prescribed today:     Oxycodone 10 mg  Max 3 pills per day; #45 pills  (printed).     *  Continue to avoid NSAIDs until 3 months post op.  We discussed activities and return to work.  *  We recommend he continue the current activity restrictions until he returns for the next visit.  *   He can return to driving if: he is off narcotic.    We discussed follow up   *  All the patient's questions have been answered and they demonstrate good understanding of the above.    *  Return to clinic in 6 weeks with Dr Salmeron.  Imaging for that visit: Plain XR  *   The patient has our contact information and is aware that he should call if he has questions comments or concerns.     We appreciate the opportunity to be of service in the care of this pleasant patient.  Please do call if there is anything more we can do    Ramona Mayer PA-C  [facility]  Department of Neurosurgery  Phone: [phone]  Fax: [phone]

## 2017-05-05 NOTE — PROGRESS NOTES
Neurosurgery clinic post op wound check  Date of visit:  5/5/2017         DATE OF PROCEDURE: 03/22/2017  Dr Salmeron  DIAGNOSIS: L4-L5 lumbar spondylolisthesis with stenosis and radiculopathy.   PROCEDURES PERFORMED:   1. Bilateral L4-5 decompression  2. Bilateral L4-L5 transforaminal lumbar interbody fusion.   3. Placement of L4-L5 pedicle screw fixation. [employer]    INDICATIONS FOR PROCEDURE: Mr. Jean-Claude Lowry is a 62-year-old gentleman who presented to the Neurosurgery Clinic at the [facility] with concerns of low back pain for about 1-1/2 years. It is designated in his lower back and radiated across his left buttock in L4 distribution. He had a prior history of bilateral L4-L5 laminectomy many years ago and subsequent L5-S1 fusion. Due to spondylolisthesis with stenosis and imaging findings, he was consented for decompression as well as fusion surgery, as mentioned above, after risks, benefits and alternatives for the procedure were explained to him in detail.   HPI:   He is now 6 weeks status post the above procedure. Following surgery the patient endorsed incisional pain, leg pain had improved. The patient does have a history of chronic pain issues however a regimen of medication was found to make his symptoms tolerable. The patient was evaluated by therapies who felt he was safe to discharge home with family. Prior to discharge the patient was voiding, passing bowel movements, medically stable and tolerating diet.      Since discharge initially he has made several calls to the team needing more pain medication. He reported he has no pain management doctor, he has a doctor managing his suboxone, his psychiatrist Dr Alcantar does it since he does have an addiction history.   He is much improved today, his last pain med prescription lasted the appropriate amount of time and he feels much better. He's walking better and is happy with the results thus far.   He called Dominic  yesterday about meds and Dominic gave him one more prescription for meds max 3 per day, after this he must see his PCP or other and he is aware of that and accepting.   He presents for routine 6 week visit.      Current Outpatient Prescriptions:      oxyCODONE (ROXICODONE) 5 MG IR tablet, Take 1 tablet (5 mg) by mouth every 8 hours as needed for pain (MAX of 3 tabs/day) Last Rx from this office.  Additional refills must be provided by Primary Care Provider or Pain Management Physician., Disp: 45 tablet, Rfl: 0     methocarbamol (ROBAXIN) 500 MG tablet, Take 1 tablet (500 mg) by mouth every 6 hours as needed for muscle spasms, Disp: 60 tablet, Rfl: 3     oxyCODONE (ROXICODONE) 5 MG IR tablet, 1 tablet every 4-6 hours as needed for pain.  Max of 6 tablets per day.  Must last until 5 may 2017.  No further refills without clinic appointment., Disp: 100 tablet, Rfl: 0     methocarbamol (ROBAXIN) 500 MG tablet, Take 1 tablet (500 mg) by mouth 4 times daily as needed for muscle spasms, Disp: 16 tablet, Rfl: 0     methocarbamol (ROBAXIN) 500 MG tablet, Take 1 tablet (500 mg) by mouth 4 times daily as needed for muscle spasms, Disp: 30 tablet, Rfl: 0     acetaminophen (TYLENOL) 500 MG tablet, Take 2 tablets (1,000 mg) by mouth every 8 hours, Disp: 90 tablet, Rfl: 0     morphine 10 MG/5ML solution, Take 3.75-7.5 mLs (7.5-15 mg) by mouth every 3 hours as needed for moderate to severe pain, Disp: 500 mL, Rfl: 0     senna-docusate (SENOKOT-S;PERICOLACE) 8.6-50 MG per tablet, Take 3 tablets by mouth 2 times daily, Disp: 60 tablet, Rfl: 0     gabapentin (NEURONTIN) 600 MG tablet, Take 1 tablet (600 mg) by mouth 3 times daily 600 mg AM, 300 mg afternoon, 600 mg HS, Disp: 90 tablet, Rfl: 0     pregabalin (LYRICA) 100 MG capsule, Take 1 capsule (100 mg) by mouth 3 times daily, Disp: 90 capsule, Rfl: 0     ledipasvir-sofosbuvir (HARVONI)  MG per tablet, Take 1 tablet by mouth every morning, Disp: , Rfl:      RABEprazole Sodium  "(ACIPHEX PO), Take 20 mg by mouth every morning , Disp: , Rfl:      DULoxetine HCl (CYMBALTA PO), Take 60 mg by mouth At Bedtime , Disp: , Rfl:      TRAZODONE HCL PO, Take 150-300 mg by mouth At Bedtime, Disp: , Rfl:   No Known Allergies  PMH, SOC HIST, FAM HIST, PROBLEM LIST:  All reviewed in EPIC.    OBJECTIVE:  /78 (BP Location: Left arm, Patient Position: Chair, Cuff Size: Adult Regular)  Pulse 75  Temp 98.1  F (36.7  C) (Oral)  Resp 18  Ht 1.626 m (5' 4.02\")  Wt 82.4 kg (181 lb 10.5 oz)  SpO2 96%  BMI 31.16 kg/m2    Imaging:   These are the pertinent findings from:   Xrays taken today.   Overall alignment of the spine in 2 planes is satisfactory and unchanged from post op. Hardware is in good position without evidence failure.  Engraftment is not yet seen.  Please see Epic for the bulk of the report.  I personally reviewed the images with the patient    EXAM:  Well developed well nourished male found seated somewhat comfortably in exam chair.  No apparent distress. He is unaccompanied.  A&O X3.  Mood and affect benign. Language and fund of knowledge intact.  Is able to sit and rise independently.   He has a healed incision.      Assessment:  1. Acute post-operative pain    2. S/P spinal fusion    3. Spinal stenosis of lumbar region with radiculopathy    4. Post-operative state      Jean-Claude Lowry is doing much better now after his lumbar decompression and fusion.      PLAN:  We discussed wound care.  *  May swim or bathe now as desired.  We discussed medication.    *  FYI: In general we reserve narcotics for pain management in the post operative recovery phase generally 2-6 weeks post op, depending on the surgery performed.  Pre-op our patients are advised that by 6 weeks post op we anticipate they will no longer require narcotic. I reinforced that today.  For pain outside the scope of these parameters we refer the patient back to his/her other providers for ongoing narcotic needs.   *  " Medications prescribed today:     Oxycodone 10 mg  Max 3 pills per day; #45 pills  (printed).     *  Continue to avoid NSAIDs until 3 months post op.  We discussed activities and return to work.  *  We recommend he continue the current activity restrictions until he returns for the next visit.  *   He can return to driving if: he is off narcotic.    We discussed follow up   *  All the patient's questions have been answered and they demonstrate good understanding of the above.    *  Return to clinic in 6 weeks with Dr Salmeron.  Imaging for that visit: Plain XR  *   The patient has our contact information and is aware that he should call if he has questions comments or concerns.     We appreciate the opportunity to be of service in the care of this pleasant patient.  Please do call if there is anything more we can do    Ramona Mayer PA-C  [facility]  Department of Neurosurgery  Phone: [phone]  Fax: [phone]

## 2017-05-05 NOTE — MR AVS SNAPSHOT
After Visit Summary   2017    Jean-Claude Lowry    MRN: [id number]           Patient Information     Date Of Birth          1955        Visit Information        Provider Department      2017 11:30 AM Ramona Mayer PA-C [facility] Neurosurgery        Today's Diagnoses     Acute post-operative pain    -  1    S/P spinal fusion        Spinal stenosis of lumbar region with radiculopathy        Post-operative state           Follow-ups after your visit        Follow-up notes from your care team     Return in about 6 weeks (around 2017).      Future tests that were ordered for you today     Open Future Orders        Priority Expected Expires Ordered    XR Lumbar Spine 2-3 Views* Routine 2017            Who to contact     Please call your clinic at [phone] to:    Ask questions about your health    Make or cancel appointments    Discuss your medicines    Learn about your test results    Speak to your doctor   If you have compliments or concerns about an experience at your clinic, or if you wish to file a complaint, please contact [facility] Physicians Patient Relations at [phone] or email us at [email or web address]         Additional Information About Your Visit        MyChart Information     [employer] is an electronic gateway that provides easy, online access to your medical records. With [employer], you can request a clinic appointment, read your test results, renew a prescription or communicate with your care team.     To sign up for [employer] visit the website at [email or web address]   You will be asked to enter the access code listed below, as well as some personal information. Please follow the directions to create your username and password.     Your access code is: [email or web address]  Expires: 8/3/2017 12:30 PM     Your access code will  in 90 days. If you need help or a new code, please contact your [facility]  "Physicians Clinic or call [phone] for assistance.        Care EveryWhere ID     This is your Care EveryWhere ID. This could be used by other organizations to access your [place] medical records  [id number]        Your Vitals Were     Pulse Temperature Respirations Height Pulse Oximetry BMI (Body Mass Index)    75 98.1  F (36.7  C) (Oral) 18 1.626 m (5' 4.02\") 96% 31.16 kg/m2       Blood Pressure from Last 3 Encounters:   05/05/17 128/78   04/06/17 115/67   03/25/17 115/58    Weight from Last 3 Encounters:   05/05/17 82.4 kg (181 lb 10.5 oz)   04/06/17 82.4 kg (181 lb 9.6 oz)   03/22/17 81.3 kg (179 lb 3.7 oz)                 Today's Medication Changes          These changes are accurate as of: 5/5/17 12:30 PM.  If you have any questions, ask your nurse or doctor.               These medicines have changed or have updated prescriptions.        Dose/Directions    * oxyCODONE 5 MG IR tablet   Commonly known as:  ROXICODONE   This may have changed:  Another medication with the same name was added. Make sure you understand how and when to take each.   Used for:  Spinal stenosis, lumbar region, without neurogenic claudication   Changed by:  Ajay Schulz, FANY        1 tablet every 4-6 hours as needed for pain.  Max of 6 tablets per day.  Must last until 5 may 2017.  No further refills without clinic appointment.   Quantity:  100 tablet   Refills:  0       * oxyCODONE 5 MG IR tablet   Commonly known as:  ROXICODONE   This may have changed:  You were already taking a medication with the same name, and this prescription was added. Make sure you understand how and when to take each.   Used for:  Acute post-operative pain   Changed by:  Ramona Mayer PA-C        Dose:  5 mg   Take 1 tablet (5 mg) by mouth every 8 hours as needed for pain (MAX of 3 tabs/day) Last Rx from this office.  Additional refills must be provided by Primary Care Provider or Pain Management Physician.   Quantity:  45 tablet   Refills:  0       " * Notice:  This list has 2 medication(s) that are the same as other medications prescribed for you. Read the directions carefully, and ask your doctor or other care provider to review them with you.         Where to get your medicines      Some of these will need a paper prescription and others can be bought over the counter.  Ask your nurse if you have questions.     Bring a paper prescription for each of these medications     oxyCODONE 5 MG IR tablet                Primary Care Provider Office Phone # Fax #    Quang Mejia [phone]       [facility] [address] [address]        Thank you!     Thank you for choosing [facility]  for your care. Our goal is always to provide you with excellent care. Hearing back from our patients is one way we can continue to improve our services. Please take a few minutes to complete the written survey that you may receive in the mail after your visit with us. Thank you!             Your Updated Medication List - Protect others around you: Learn how to safely use, store and throw away your medicines at www.disposemymeds.org.          This list is accurate as of: 5/5/17 12:30 PM.  Always use your most recent med list.                   Brand Name Dispense Instructions for use    acetaminophen 500 MG tablet    TYLENOL    90 tablet    Take 2 tablets (1,000 mg) by mouth every 8 hours       ACIPHEX PO      Take 20 mg by mouth every morning       CYMBALTA PO      Take 60 mg by mouth At Bedtime       gabapentin 600 MG tablet    NEURONTIN    90 tablet    Take 1 tablet (600 mg) by mouth 3 times daily 600 mg AM, 300 mg afternoon, 600 mg HS       ledipasvir-sofosbuvir  MG per tablet    HARVONI     Take 1 tablet by mouth every morning       * methocarbamol 500 MG tablet    ROBAXIN    16 tablet    Take 1 tablet (500 mg) by mouth 4 times daily as needed for muscle spasms       * methocarbamol 500 MG tablet    ROBAXIN    30 tablet     Take 1 tablet (500 mg) by mouth 4 times daily as needed for muscle spasms       * methocarbamol 500 MG tablet    ROBAXIN    60 tablet    Take 1 tablet (500 mg) by mouth every 6 hours as needed for muscle spasms       morphine 2 mg/mL solution     500 mL    Take 3.75-7.5 mLs (7.5-15 mg) by mouth every 3 hours as needed for moderate to severe pain       * oxyCODONE 5 MG IR tablet    ROXICODONE    100 tablet    1 tablet every 4-6 hours as needed for pain.  Max of 6 tablets per day.  Must last until 5 may 2017.  No further refills without clinic appointment.       * oxyCODONE 5 MG IR tablet    ROXICODONE    45 tablet    Take 1 tablet (5 mg) by mouth every 8 hours as needed for pain (MAX of 3 tabs/day) Last Rx from this office.  Additional refills must be provided by Primary Care Provider or Pain Management Physician.       pregabalin 100 MG capsule    LYRICA    90 capsule    Take 1 capsule (100 mg) by mouth 3 times daily       senna-docusate 8.6-50 MG per tablet    SENOKOT-S;PERICOLACE    60 tablet    Take 3 tablets by mouth 2 times daily       TRAZODONE HCL PO      Take 150-300 mg by mouth At Bedtime       * Notice:  This list has 5 medication(s) that are the same as other medications prescribed for you. Read the directions carefully, and ask your doctor or other care provider to review them with you.

## 2017-07-16 ENCOUNTER — DOCUMENTATION ONLY (OUTPATIENT)
Dept: OTHER | Facility: CLINIC | Age: 62
End: 2017-07-16

## 2017-07-16 DIAGNOSIS — Z71.89 ACP (ADVANCE CARE PLANNING): Chronic | ICD-10-CM

## 2017-10-09 ENCOUNTER — OFFICE VISIT (OUTPATIENT)
Dept: NEUROSURGERY | Facility: CLINIC | Age: 62
End: 2017-10-09

## 2017-10-09 VITALS — HEART RATE: 68 BPM | SYSTOLIC BLOOD PRESSURE: 127 MMHG | HEIGHT: 64 IN | DIASTOLIC BLOOD PRESSURE: 74 MMHG

## 2017-10-09 DIAGNOSIS — M54.16 SPINAL STENOSIS OF LUMBAR REGION WITH RADICULOPATHY: ICD-10-CM

## 2017-10-09 DIAGNOSIS — M48.061 SPINAL STENOSIS OF LUMBAR REGION WITH RADICULOPATHY: ICD-10-CM

## 2017-10-09 DIAGNOSIS — M43.16 SPONDYLOLISTHESIS OF LUMBAR REGION: Primary | ICD-10-CM

## 2017-10-09 ASSESSMENT — PAIN SCALES - GENERAL: PAINLEVEL: SEVERE PAIN (7)

## 2017-10-09 NOTE — PROGRESS NOTES
"It was a pleasure to see Jean-Claude Lowry today in Neurosurgery Clinic. he is a 62 year old male who underwent:    DATE OF PROCEDURE:  03/22/2017       PREOPERATIVE DIAGNOSIS:  L4-L5 lumbar spondylolisthesis with stenosis and radiculopathy.       POSTOPERATIVE DIAGNOSIS:  L4-L5 lumbar spondylolisthesis with stenosis and radiculopathy.       PROCEDURES PERFORMED:   1.  Bilateral L4-5 decompression  2.  Bilateral L4-L5 transforaminal lumbar interbody fusion.   3.  Placement of L4-L5 pedicle screw fixation.   4.  Use of intraoperative neuronavigation.   5.  Use of intraoperative O-arm.    6.  Use of intraoperative microscopy.       SURGEON:  Paco Salmeron MD       ASSISTANT:  Scottie Otto MD     He is doing reasonably well. He had knee surgery in July.    Vitals:    10/09/17 0927   BP: 127/74   Pulse: 68   Height: 1.626 m (5' 4\")     There is no height or weight on file to calculate BMI.  Severe Pain (7)     Awake alert.    Incisions well healed.   Strength 5/5 BUE/LE    Mildly antalgic gait. Posture erect.    Imaging: Xrays today show stable alignment and position of hardware. The imaging was shown to the patient and reviewed in clinic.     A: s/p L4-5 fusion. Doing well.    P: RTC 6m+ CT Lumbar and standing scoli xrays.  "

## 2017-10-09 NOTE — NURSING NOTE
Chief Complaint   Patient presents with     RECHECK     XRAY PRIOR, Follow up/ s/p bilateral L4-5 decompression, Bilateral L4-L5 transforaminal lumbar interbody fusion, DOS: 3/22 w/Jaron.    Valeria Whitt, [facility]

## 2017-10-09 NOTE — MR AVS SNAPSHOT
After Visit Summary   10/9/2017    Jean-Claude Lowry    MRN: [id number]           Patient Information     Date Of Birth          1955        Visit Information        Provider Department      10/9/2017 8:45 AM Paco Salmeron MD [facility] Neurosurgery        Today's Diagnoses     Spondylolisthesis of lumbar region    -  1    Spinal stenosis of lumbar region with radiculopathy           Follow-ups after your visit        Follow-up notes from your care team     Return in about 6 months (around 2018) for Imaging.      Who to contact     Please call your clinic at [phone] to:    Ask questions about your health    Make or cancel appointments    Discuss your medicines    Learn about your test results    Speak to your doctor   If you have compliments or concerns about an experience at your clinic, or if you wish to file a complaint, please contact [facility] Patient Relations at [phone] or email us at [email or web address]         Additional Information About Your Visit        MyChart Information     [employer] is an electronic gateway that provides easy, online access to your medical records. With [employer], you can request a clinic appointment, read your test results, renew a prescription or communicate with your care team.     To sign up for [employer] visit the website at [email or web address]   You will be asked to enter the access code listed below, as well as some personal information. Please follow the directions to create your username and password.     Your access code is: [email or web address]  Expires: 2017  6:30 AM     Your access code will  in 90 days. If you need help or a new code, please contact your [facility] or call [phone] for assistance.        Care EveryWhere ID     This is your Care EveryWhere ID. This could be used by other organizations to access your [facility]  "records  [id number]        Your Vitals Were     Pulse Height                68 1.626 m (5' 4\")           Blood Pressure from Last 3 Encounters:   10/09/17 127/74   05/05/17 128/78   04/06/17 115/67    Weight from Last 3 Encounters:   05/05/17 82.4 kg (181 lb 10.5 oz)   04/06/17 82.4 kg (181 lb 9.6 oz)   03/22/17 81.3 kg (179 lb 3.7 oz)               Primary Care Provider Office Phone # Fax #    Quang Mejia [phone]       [address] [address]        Equal Access to Services     BUTCH MCPHERSON : Hadii cris lehmano Somateo, waaxda luqadaha, qaybta kaalmada adeegyada, antonietta ozuna . So [facility] [phone].    ATENCIÓN: Si habla español, tiene a simth disposición servicios gratuitos de asistencia lingüística. Ll[facility] [phone].    We comply with applicable federal civil rights laws and [place] laws. We do not discriminate on the basis of race, color, national origin, age, disability, sex, sexual orientation, or gender identity.            Thank you!     Thank you for choosing [facility]  for your care. Our goal is always to provide you with excellent care. Hearing back from our patients is one way we can continue to improve our services. Please take a few minutes to complete the written survey that you may receive in the mail after your visit with us. Thank you!             Your Updated Medication List - Protect others around you: Learn how to safely use, store and throw away your medicines at www.disposemymeds.org.          This list is accurate as of: 10/9/17 11:59 PM.  Always use your most recent med list.                   Brand Name Dispense Instructions for use Diagnosis    acetaminophen 500 MG tablet    TYLENOL    90 tablet    Take 2 tablets (1,000 mg) by mouth every 8 hours    S/P spinal fusion       ACIPHEX PO      Take 20 mg by mouth every morning        CYMBALTA PO      Take 60 mg by mouth At Bedtime        " gabapentin 600 MG tablet    NEURONTIN    90 tablet    Take 1 tablet (600 mg) by mouth 3 times daily 600 mg AM, 300 mg afternoon, 600 mg HS    S/P spinal fusion       ledipasvir-sofosbuvir  MG per tablet    HARVONI     Take 1 tablet by mouth every morning    Lumbar stenosis       * methocarbamol 500 MG tablet    ROBAXIN    16 tablet    Take 1 tablet (500 mg) by mouth 4 times daily as needed for muscle spasms    Spinal stenosis, lumbar region, without neurogenic claudication       * methocarbamol 500 MG tablet    ROBAXIN    30 tablet    Take 1 tablet (500 mg) by mouth 4 times daily as needed for muscle spasms    Lumbar spondylolysis       * methocarbamol 500 MG tablet    ROBAXIN    60 tablet    Take 1 tablet (500 mg) by mouth every 6 hours as needed for muscle spasms    S/P spinal fusion       morphine 10 MG/5ML solution     500 mL    Take 3.75-7.5 mLs (7.5-15 mg) by mouth every 3 hours as needed for moderate to severe pain    S/P spinal fusion       oxyCODONE 5 MG IR tablet    ROXICODONE    100 tablet    1 tablet every 4-6 hours as needed for pain.  Max of 6 tablets per day.  Must last until 5 may 2017.  No further refills without clinic appointment.    Spinal stenosis, lumbar region, without neurogenic claudication       pregabalin 100 MG capsule    LYRICA    90 capsule    Take 1 capsule (100 mg) by mouth 3 times daily    Spondylolisthesis of lumbar region, Spinal stenosis of lumbar region with radiculopathy       senna-docusate 8.6-50 MG per tablet    SENOKOT-S;PERICOLACE    60 tablet    Take 3 tablets by mouth 2 times daily    S/P spinal fusion       TRAZODONE HCL PO      Take 150-300 mg by mouth At Bedtime        * Notice:  This list has 3 medication(s) that are the same as other medications prescribed for you. Read the directions carefully, and ask your doctor or other care provider to review them with you.

## 2017-10-09 NOTE — LETTER
"10/9/2017       RE: Jean-Claude Lowry  [address] [facility]  [address]  [place] [address]     Dear Colleague,    Thank you for referring your patient, Jean-Claude Lowry, to the [facility] NEUROSURGERY at [facility]. Please see a copy of my visit note below.    It was a pleasure to see Jean-Claude Lowry today in Neurosurgery Clinic. he is a 62 year old male who underwent:    DATE OF PROCEDURE:  03/22/2017       PREOPERATIVE DIAGNOSIS:  L4-L5 lumbar spondylolisthesis with stenosis and radiculopathy.       POSTOPERATIVE DIAGNOSIS:  L4-L5 lumbar spondylolisthesis with stenosis and radiculopathy.       PROCEDURES PERFORMED:   1.  Bilateral L4-5 decompression  2.  Bilateral L4-L5 transforaminal lumbar interbody fusion.   3.  Placement of L4-L5 pedicle screw fixation.   4.  Use of intraoperative neuronavigation.   5.  Use of intraoperative O-arm.    6.  Use of intraoperative microscopy.       SURGEON:  Paco Salmeron MD       ASSISTANT:  Scottie Otto MD     He is doing reasonably well. He had knee surgery in July.    Vitals:    10/09/17 0927   BP: 127/74   Pulse: 68   Height: 1.626 m (5' 4\")     There is no height or weight on file to calculate BMI.  Severe Pain (7)     Awake alert.    Incisions well healed.   Strength 5/5 BUE/LE    Mildly antalgic gait. Posture erect.    Imaging: Xrays today show stable alignment and position of hardware. The imaging was shown to the patient and reviewed in clinic.     A: s/p L4-5 fusion. Doing well.    P: RTC 6m+ CT Lumbar and standing scoli xrays.    Again, thank you for allowing me to participate in the care of your patient.      Sincerely,    Paco Salmeron MD      "

## 2017-12-15 NOTE — PROGRESS NOTES
" 03/24/17 1500   Quick Adds   Type of Visit Initial Occupational Therapy Evaluation   Living Environment   Lives With spouse   Living Arrangements house   Home Accessibility stairs to enter home;tub/shower is not walk in   Number of Stairs to Enter Home 17   Number of Stairs Within Home 0   Stair Railings at Home (yes)   Transportation Available car;family or friend will provide   Living Environment Comment Pt reports independence navigating thoughout his home at baseline; drives though wife able to provide transportation prn   Self-Care   Dominant Hand right   Usual Activity Tolerance moderate   Current Activity Tolerance fair   Regular Exercise no   Equipment Currently Used at Home cane, straight   Activity/Exercise/Self-Care Comment Pt reports he was previously independent with basic ADLs, drives and completes shopping tasks though uses motorized cart within the store.  uses cane for ambulation, previously not using any AE for dressing or bathing.  pt wife does the cooking though pt states he assists with laundry and cleaning;  all needs on same level of the home   Functional Level Prior   Ambulation 1-->assistive equipment   Transferring 0-->independent   Toileting 0-->independent   Bathing 2-->assistive person   Dressing 0-->independent   Eating 0-->independent   Cognition 0 - no cognition issues reported   Fall history within last six months no   Which of the above functional risks had a recent onset or change? ambulation;transferring;toileting;bathing   Prior Functional Level Comment Pt reports his wife works days though is able to take \"some\" time off upon pt discharge to assist       Present no   Language english   General Information   Onset of Illness/Injury or Date of Surgery - Date 03/22/17   Referring Physician Destinee Bartlett MD   Patient/Family Goals Statement Return to home; manage pain   Additional Occupational Profile Info/Pertinent History of Current Problem Jean-Claude Lowry " is a 62 year old male who is postoperative day # 2 from Bilateral L4-5 TLIF and placement of L4-5 pedicle screws   Precautions/Limitations fall precautions;spinal precautions   General Observations Pt limited by pain; sidelying on L at beginning and end of session; limited by pain, frequent valsalva   General Info Comments Activity: up with assist   Cognitive Status Examination   Orientation orientation to person, place and time   Level of Consciousness alert   Able to Follow Commands WNL/WFL   Attention Sustained attention impaired   Cognitive Comment Pt alert and appropriately interactive though processing slow; will monitor   Visual Perception   Visual Perception Comments no acute changes noted; pt has glasses but typically does not wear them at baseline   Sensory Examination   Sensory Quick Adds No deficits were identified   Pain Assessment   Patient Currently in Pain (back pain/incisional pain limiting activity)   Integumentary/Edema   Integumentary/Edema no deficits were identifed   Range of Motion (ROM)   ROM Comment BUE WFL though limited at end range 2/2 pain   Strength   Strength Comments not formally assessed 2/2 post surgical precautions though pt demonstrates appropriate functional strength in all extremities   Mobility   Bed Mobility Bed mobility skill: Sit to supine;Bed mobility skill: Supine to sit   Bed Mobility Skill: Sit to Supine   Level of [place]: Sit/Supine stand-by assist   Physical Assist/Nonphysical Assist: Sit/Supine verbal cues   Bed Mobility Skill: Supine to Sit   Level of [place]: Supine/Sit stand-by assist   Physical Assist/Nonphysical Assist: Supine/Sit verbal cues   Transfer Skill: Bed to Chair/Chair to Bed   Level of [place]: Bed to Chair contact guard   Assistive Device - Transfer Skill Bed to Chair Chair to Bed Rehab Eval standard walker   Transfer Skill: Sit to Stand   Level of [place]: Sit/Stand contact guard   Assistive Device for Transfer: Sit/Stand  standard walker   Transfer Skill: Toilet Transfer   Level of [place]: Toilet minimum assist (75% patients effort)   Assistive Device grab bars   Toilet Transfer Skill Comments walker   Tub/Shower Transfer   Tub/Shower Transfer Comments to be assessed   Balance   Balance Comments no LOB with functional ambulation and transfers using FWW   Lower Body Dressing   Level of [place]: Dress Lower Body moderate assist (50% patients effort)   Toileting   Level of [place]: Toilet moderate assist (50% patients effort)   Instrumental Activities of Daily Living (IADL)   Previous Responsibilities housekeeping;laundry;shopping;driving   IADL Comments Pt wife able to assist with IADLs prn   Activities of Daily Living Analysis   Impairments Contributing to Impaired Activities of Daily Living balance impaired;pain;post surgical precautions;ROM decreased;strength decreased   General Therapy Interventions   Planned Therapy Interventions ADL retraining;IADL retraining;bed mobility training;ROM;strengthening;transfer training   Clinical Impression   Criteria for Skilled Therapeutic Interventions Met yes, treatment indicated   OT Diagnosis decreased activity tolerance and independence with ADLS   Influenced by the following impairments pain, post surgical precautions, impaired ROM/reach, deconditioning   Assessment of Occupational Performance 3-5 Performance Deficits   Identified Performance Deficits dressing, bathing, toileting, driving, shopping   Clinical Decision Making (Complexity) Low complexity   Therapy Frequency daily   Predicted Duration of Therapy Intervention (days/wks) 3/30/17   Anticipated Equipment Needs at Discharge reacher;shower chair   Anticipated Discharge Disposition Home with Assist;Home with Home Therapy   Risks and Benefits of Treatment have been explained. Yes   Patient, Family & other staff in agreement with plan of care Yes   Clinical Impression Comments pending safe stair completion and  availability of assist for self cares; anticipate pt will be able to discharge to home with initial 24hr assist and home OT/PT services;  Will likely need shower chair and/or reacher   Total Evaluation Time   Total Evaluation Time (Minutes) 5      Statement Selected

## 2018-04-16 ENCOUNTER — RADIANT APPOINTMENT (OUTPATIENT)
Dept: CT IMAGING | Facility: CLINIC | Age: 63
End: 2018-04-16
Attending: NEUROLOGICAL SURGERY
Payer: MEDICARE

## 2018-04-16 ENCOUNTER — RADIANT APPOINTMENT (OUTPATIENT)
Dept: GENERAL RADIOLOGY | Facility: CLINIC | Age: 63
End: 2018-04-16
Attending: NEUROLOGICAL SURGERY
Payer: MEDICARE

## 2018-04-16 ENCOUNTER — OFFICE VISIT (OUTPATIENT)
Dept: NEUROSURGERY | Facility: CLINIC | Age: 63
End: 2018-04-16
Payer: MEDICARE

## 2018-04-16 VITALS
HEIGHT: 64 IN | BODY MASS INDEX: 32.64 KG/M2 | HEART RATE: 95 BPM | SYSTOLIC BLOOD PRESSURE: 125 MMHG | WEIGHT: 191.2 LBS | DIASTOLIC BLOOD PRESSURE: 74 MMHG

## 2018-04-16 DIAGNOSIS — M54.16 SPINAL STENOSIS OF LUMBAR REGION WITH RADICULOPATHY: ICD-10-CM

## 2018-04-16 DIAGNOSIS — M43.16 SPONDYLOLISTHESIS OF LUMBAR REGION: ICD-10-CM

## 2018-04-16 DIAGNOSIS — M48.061 SPINAL STENOSIS OF LUMBAR REGION WITH RADICULOPATHY: ICD-10-CM

## 2018-04-16 DIAGNOSIS — M43.10 DEGENERATIVE SPONDYLOLISTHESIS: Primary | ICD-10-CM

## 2018-04-16 ASSESSMENT — PAIN SCALES - GENERAL: PAINLEVEL: EXTREME PAIN (9)

## 2018-04-16 NOTE — MR AVS SNAPSHOT
"              After Visit Summary   2018    Jean-Claude Lowry    MRN: [id number]           Patient Information     Date Of Birth          1955        Visit Information        Provider Department      2018 11:30 AM Paco Salmeron MD [facility] Neurosurgery        Today's Diagnoses     Degenerative spondylolisthesis    -  1    Spinal stenosis of lumbar region with radiculopathy           Follow-ups after your visit        Follow-up notes from your care team     Return if symptoms worsen or fail to improve.      Who to contact     Please call your clinic at [phone] to:    Ask questions about your health    Make or cancel appointments    Discuss your medicines    Learn about your test results    Speak to your doctor            Additional Information About Your Visit        MyChart Information     [employer] is an electronic gateway that provides easy, online access to your medical records. With [employer], you can request a clinic appointment, read your test results, renew a prescription or communicate with your care team.     To sign up for [employer] visit the website at [email or web address]   You will be asked to enter the access code listed below, as well as some personal information. Please follow the directions to create your username and password.     Your access code is: [email or web address]  Expires: 2018  6:30 AM     Your access code will  in 90 days. If you need help or a new code, please contact your [facility] or call [phone] for assistance.        Care EveryWhere ID     This is your Care EveryWhere ID. This could be used by other organizations to access your [facility] medical records  [id number]        Your Vitals Were     Pulse Height BMI (Body Mass Index)             95 1.626 m (5' 4\") 32.82 kg/m2          Blood Pressure from Last 3 Encounters:   18 125/74   10/09/17 127/74   17 128/78    Weight from Last 3 Encounters:   18 " 86.7 kg (191 lb 3.2 oz)   05/05/17 82.4 kg (181 lb 10.5 oz)   04/06/17 82.4 kg (181 lb 9.6 oz)              Today, you had the following     No orders found for display       Primary Care Provider Office Phone # Fax #    Quang Mejia [phone]       [facility] [address] [facility] [address]        Equal Access to Services     BUTCH MCPHERSON : Hadii aad ku hadasho Soomaali, waaxda luqadaha, qaybta kaalmada adeegyada, waxay idiin hayaan adeeg kharahelen la'corrinen . So [facility] [phone].    ATENCIÓN: Si primola espjacky, tiene a smith disposición servicios gratuitos de asistencia lingüística. LlSt. Francis [facility] [phone].    We comply with applicable federal civil rights laws and [place] laws. We do not discriminate on the basis of race, color, national origin, age, disability, sex, sexual orientation, or gender identity.            Thank you!     Thank you for choosing [facility]  for your care. Our goal is always to provide you with excellent care. Hearing back from our patients is one way we can continue to improve our services. Please take a few minutes to complete the written survey that you may receive in the mail after your visit with us. Thank you!             Your Updated Medication List - Protect others around you: Learn how to safely use, store and throw away your medicines at www.disposemymeds.org.          This list is accurate as of 4/16/18 11:59 PM.  Always use your most recent med list.                   Brand Name Dispense Instructions for use Diagnosis    acetaminophen 500 MG tablet    TYLENOL    90 tablet    Take 2 tablets (1,000 mg) by mouth every 8 hours    S/P spinal fusion       ACIPHEX PO      Take 20 mg by mouth every morning        CYMBALTA PO      Take 60 mg by mouth At Bedtime        gabapentin 600 MG tablet    NEURONTIN    90 tablet    Take 1 tablet (600 mg) by mouth 3 times daily 600 mg AM, 300 mg afternoon, 600 mg HS    S/P spinal fusion        ledipasvir-sofosbuvir  MG per tablet    HARVONI     Take 1 tablet by mouth every morning    Lumbar stenosis       * methocarbamol 500 MG tablet    ROBAXIN    16 tablet    Take 1 tablet (500 mg) by mouth 4 times daily as needed for muscle spasms    Spinal stenosis, lumbar region, without neurogenic claudication       * methocarbamol 500 MG tablet    ROBAXIN    30 tablet    Take 1 tablet (500 mg) by mouth 4 times daily as needed for muscle spasms    Lumbar spondylolysis       * methocarbamol 500 MG tablet    ROBAXIN    60 tablet    Take 1 tablet (500 mg) by mouth every 6 hours as needed for muscle spasms    S/P spinal fusion       morphine 10 MG/5ML solution     500 mL    Take 3.75-7.5 mLs (7.5-15 mg) by mouth every 3 hours as needed for moderate to severe pain    S/P spinal fusion       oxyCODONE IR 5 MG tablet    ROXICODONE    100 tablet    1 tablet every 4-6 hours as needed for pain.  Max of 6 tablets per day.  Must last until 5 may 2017.  No further refills without clinic appointment.    Spinal stenosis, lumbar region, without neurogenic claudication       pregabalin 100 MG capsule    LYRICA    90 capsule    Take 1 capsule (100 mg) by mouth 3 times daily    Spondylolisthesis of lumbar region, Spinal stenosis of lumbar region with radiculopathy       senna-docusate 8.6-50 MG per tablet    SENOKOT-S;PERICOLACE    60 tablet    Take 3 tablets by mouth 2 times daily    S/P spinal fusion       TRAZODONE HCL PO      Take 150-300 mg by mouth At Bedtime        * Notice:  This list has 3 medication(s) that are the same as other medications prescribed for you. Read the directions carefully, and ask your doctor or other care provider to review them with you.

## 2018-04-16 NOTE — LETTER
"4/16/2018       RE: Jean-Claude Lowry  [address] [facility]  [address]  [place] [address]     Dear Colleague,    Thank you for referring your patient, Jean-Claude Lowry, to the [facility] NEUROSURGERY at [facility]. Please see a copy of my visit note below.    It was a pleasure to see Jean-Claude Lowry today in Neurosurgery Clinic. He is a 63 year old male who underwent:   DATE OF PROCEDURE:  03/22/2017       PREOPERATIVE DIAGNOSIS:  L4-L5 lumbar spondylolisthesis with stenosis and radiculopathy.       POSTOPERATIVE DIAGNOSIS:  L4-L5 lumbar spondylolisthesis with stenosis and radiculopathy.       PROCEDURES PERFORMED:   1.  Bilateral L4-5 decompression  2.  Bilateral L4-L5 transforaminal lumbar interbody fusion.   3.  Placement of L4-L5 pedicle screw fixation.   4.  Use of intraoperative neuronavigation.   5.  Use of intraoperative O-arm.    6.  Use of intraoperative microscopy.       SURGEON:  Paco Salmeron MD       ASSISTANT:  Scottie Otto MD     He is doing reasonably well. Having pain in L hip.    Vitals:    04/16/18 1025   BP: 125/74   BP Location: Right arm   Patient Position: Sitting   Cuff Size: Adult Large   Pulse: 95   Weight: 86.7 kg (191 lb 3.2 oz)   Height: 1.626 m (5' 4\")     Body mass index is 32.82 kg/(m^2).  Extreme Pain (9)    Awake, alert.     Incisions well healed.    Strength 5/5 BLE. Except limited by pain    + YEN L.      Imaging: CT shows stable alignment with bone growth across disc space. Alignment looks good PI 57, LL49. The imaging was shown to the patient and reviewed in clinic.     Assessment: S/P L4-5 fusion. Left hip pain    Plan: RTC PRN. Encouraged him to talk to PCP or orthopedist about his hip.     Again, thank you for allowing me to participate in the care of your patient.      Sincerely,    Paco Salmeron MD      "

## 2018-04-16 NOTE — PROGRESS NOTES
"It was a pleasure to see Jean-Claude Lowry today in Neurosurgery Clinic. He is a 63 year old male who underwent:   DATE OF PROCEDURE:  03/22/2017       PREOPERATIVE DIAGNOSIS:  L4-L5 lumbar spondylolisthesis with stenosis and radiculopathy.       POSTOPERATIVE DIAGNOSIS:  L4-L5 lumbar spondylolisthesis with stenosis and radiculopathy.       PROCEDURES PERFORMED:   1.  Bilateral L4-5 decompression  2.  Bilateral L4-L5 transforaminal lumbar interbody fusion.   3.  Placement of L4-L5 pedicle screw fixation.   4.  Use of intraoperative neuronavigation.   5.  Use of intraoperative O-arm.    6.  Use of intraoperative microscopy.       SURGEON:  Paco Salmeron MD       ASSISTANT:  Scottie Otto MD     He is doing reasonably well. Having pain in L hip.    Vitals:    04/16/18 1025   BP: 125/74   BP Location: Right arm   Patient Position: Sitting   Cuff Size: Adult Large   Pulse: 95   Weight: 86.7 kg (191 lb 3.2 oz)   Height: 1.626 m (5' 4\")     Body mass index is 32.82 kg/(m^2).  Extreme Pain (9)    Awake, alert.     Incisions well healed.    Strength 5/5 BLE. Except limited by pain    + YEN L.      Imaging: CT shows stable alignment with bone growth across disc space. Alignment looks good PI 57, LL49. The imaging was shown to the patient and reviewed in clinic.     Assessment: S/P L4-5 fusion. Left hip pain    Plan: RTC PRN. Encouraged him to talk to PCP or orthopedist about his hip.   "

## (undated) DEVICE — KIT PATIENT CARE JACKSON SPINE PACK 5808PV

## (undated) DEVICE — ESU ELEC BLADE 6" COATED/INSULATED E1455-6

## (undated) DEVICE — MOUSE O ARM 9732721

## (undated) DEVICE — SU VICRYL 0 CT CR 8X18" J752D

## (undated) DEVICE — CATH TRAY FOLEY SURESTEP 16FR W/URNE MTR STLK LATEX A303316A

## (undated) DEVICE — DRAPE C-ARM W/STRAPS 42X72" 07-CA104

## (undated) DEVICE — PREP CHLORAPREP CLEAR 3ML 260400

## (undated) DEVICE — DRSG PRIMAPORE 02X3" 7133

## (undated) DEVICE — COVER BIG CASE BACK TABLE 6'X2' 420-HD-S

## (undated) DEVICE — SU DERMABOND ADVANCED .7ML DNX12

## (undated) DEVICE — SPONGE SURGIFOAM 100 1974

## (undated) DEVICE — BUR MATCHSTICK 3MM ANSPACH L-8NS-G1

## (undated) DEVICE — PACK GOWN 3/PK DISP XL SBA32GPFCB

## (undated) DEVICE — SU VICRYL 4-0 PS-2 18" UND J496H

## (undated) DEVICE — SYR 30ML LL W/O NDL 302832

## (undated) DEVICE — SOL WATER IRRIG 1000ML BOTTLE 2F7114

## (undated) DEVICE — SOL ADH LIQUID BENZOIN SWAB 0.6ML C1544

## (undated) DEVICE — WIPES FOLEY CARE SURESTEP PROVON DFC100

## (undated) DEVICE — DRAPE MAYO STAND 23X54 8337

## (undated) DEVICE — ESU GROUND PAD ADULT W/CORD E7507

## (undated) DEVICE — Device

## (undated) DEVICE — PREP CHLORAPREP 26ML TINTED ORANGE  260815

## (undated) DEVICE — DRAPE MICROSCOPE LEICA 54X150" AR8033650

## (undated) DEVICE — LINEN TOWEL PACK X6 WHITE 5487

## (undated) DEVICE — BLADE BONE MILL STRK 3.2MM FINE 5400-702-000

## (undated) DEVICE — PACK NEURO MINOR UMMC SNE32MNMU4

## (undated) DEVICE — SUCTION MANIFOLD DORNOCH ULTRA CART UL-CL500

## (undated) DEVICE — PIN PERCUTANEOUS NAVIGATION FOR SPINE O-ARM150MM 9733236

## (undated) DEVICE — MARKER SPHERES PASSIVE MEDT PACK 5 8801075

## (undated) DEVICE — LINEN TOWEL PACK X30 5481

## (undated) DEVICE — DRAPE O ARM TUBE 9732722

## (undated) DEVICE — SPONGE COTTONOID 1/2X1/2" 20-04S

## (undated) DEVICE — DRAPE O ARM BAR MEDTRONIC 9733023

## (undated) DEVICE — NDL BLUNT 15GA 1.5"

## (undated) RX ORDER — FENTANYL CITRATE 50 UG/ML
INJECTION, SOLUTION INTRAMUSCULAR; INTRAVENOUS
Status: DISPENSED
Start: 2017-03-22

## (undated) RX ORDER — KETAMINE HYDROCHLORIDE 10 MG/ML
INJECTION, SOLUTION INTRAMUSCULAR; INTRAVENOUS
Status: DISPENSED
Start: 2017-03-22

## (undated) RX ORDER — SODIUM CHLORIDE 9 MG/ML
INJECTION, SOLUTION INTRAVENOUS
Status: DISPENSED
Start: 2017-03-22

## (undated) RX ORDER — BUPIVACAINE HYDROCHLORIDE AND EPINEPHRINE 2.5; 5 MG/ML; UG/ML
INJECTION, SOLUTION EPIDURAL; INFILTRATION; INTRACAUDAL; PERINEURAL
Status: DISPENSED
Start: 2017-03-22

## (undated) RX ORDER — KETAMINE HYDROCHLORIDE 100 MG/ML
INJECTION, SOLUTION INTRAMUSCULAR; INTRAVENOUS
Status: DISPENSED
Start: 2017-03-22

## (undated) RX ORDER — GABAPENTIN 300 MG/1
CAPSULE ORAL
Status: DISPENSED
Start: 2017-03-22

## (undated) RX ORDER — ACETAMINOPHEN 325 MG/1
TABLET ORAL
Status: DISPENSED
Start: 2017-03-22

## (undated) RX ORDER — CEFAZOLIN SODIUM 2 G/100ML
INJECTION, SOLUTION INTRAVENOUS
Status: DISPENSED
Start: 2017-03-22

## (undated) RX ORDER — HYDROMORPHONE HYDROCHLORIDE 1 MG/ML
INJECTION, SOLUTION INTRAMUSCULAR; INTRAVENOUS; SUBCUTANEOUS
Status: DISPENSED
Start: 2017-03-22

## (undated) RX ORDER — BACITRACIN 50000 [IU]/1
INJECTION, POWDER, FOR SOLUTION INTRAMUSCULAR
Status: DISPENSED
Start: 2017-03-22